# Patient Record
Sex: MALE | Race: BLACK OR AFRICAN AMERICAN | URBAN - NONMETROPOLITAN AREA
[De-identification: names, ages, dates, MRNs, and addresses within clinical notes are randomized per-mention and may not be internally consistent; named-entity substitution may affect disease eponyms.]

---

## 2016-12-22 RX ORDER — ACETAMINOPHEN 325 MG/1
650 TABLET ORAL EVERY 6 HOURS PRN
COMMUNITY

## 2016-12-22 RX ORDER — TIZANIDINE HYDROCHLORIDE 4 MG/1
4 CAPSULE, GELATIN COATED ORAL DAILY
COMMUNITY

## 2016-12-22 RX ORDER — TRAMADOL HYDROCHLORIDE 50 MG/1
50 TABLET ORAL EVERY 6 HOURS PRN
COMMUNITY

## 2016-12-22 RX ORDER — GLIPIZIDE 10 MG/1
10 TABLET ORAL DAILY
COMMUNITY

## 2016-12-22 RX ORDER — LISINOPRIL 10 MG/1
10 TABLET ORAL DAILY
COMMUNITY

## 2017-01-02 ENCOUNTER — ANESTHESIA EVENT (OUTPATIENT)
Dept: PERIOP | Facility: HOSPITAL | Age: 57
End: 2017-01-02
Payer: COMMERCIAL

## 2017-01-03 ENCOUNTER — HOSPITAL ENCOUNTER (OUTPATIENT)
Facility: HOSPITAL | Age: 57
Setting detail: OUTPATIENT SURGERY
Discharge: HOME | End: 2017-01-03
Attending: UROLOGY | Admitting: UROLOGY
Payer: COMMERCIAL

## 2017-01-03 ENCOUNTER — ANESTHESIA (OUTPATIENT)
Dept: PERIOP | Facility: HOSPITAL | Age: 57
End: 2017-01-03
Payer: COMMERCIAL

## 2017-01-03 VITALS
DIASTOLIC BLOOD PRESSURE: 76 MMHG | RESPIRATION RATE: 18 BRPM | SYSTOLIC BLOOD PRESSURE: 115 MMHG | BODY MASS INDEX: 27.77 KG/M2 | TEMPERATURE: 98.5 F | HEART RATE: 57 BPM | HEIGHT: 72 IN | OXYGEN SATURATION: 97 % | WEIGHT: 205 LBS

## 2017-01-03 DIAGNOSIS — N47.1 PHIMOSIS: ICD-10-CM

## 2017-01-03 LAB
GLUCOSE SERPL-MCNC: 113 MG/DL (ref 65–140)
GLUCOSE SERPL-MCNC: 178 MG/DL (ref 65–140)

## 2017-01-03 PROCEDURE — 82948 REAGENT STRIP/BLOOD GLUCOSE: CPT

## 2017-01-03 PROCEDURE — 88304 TISSUE EXAM BY PATHOLOGIST: CPT | Performed by: UROLOGY

## 2017-01-03 RX ORDER — DOCUSATE SODIUM 100 MG/1
100 CAPSULE, LIQUID FILLED ORAL 2 TIMES DAILY
Qty: 60 CAPSULE | Refills: 0 | Status: SHIPPED | OUTPATIENT
Start: 2017-01-03 | End: 2017-02-02

## 2017-01-03 RX ORDER — FENTANYL CITRATE 50 UG/ML
INJECTION, SOLUTION INTRAMUSCULAR; INTRAVENOUS AS NEEDED
Status: DISCONTINUED | OUTPATIENT
Start: 2017-01-03 | End: 2017-01-03 | Stop reason: SURG

## 2017-01-03 RX ORDER — FENTANYL CITRATE/PF 50 MCG/ML
25 SYRINGE (ML) INJECTION
Status: DISCONTINUED | OUTPATIENT
Start: 2017-01-03 | End: 2017-01-03 | Stop reason: HOSPADM

## 2017-01-03 RX ORDER — LIDOCAINE HYDROCHLORIDE 10 MG/ML
INJECTION, SOLUTION INFILTRATION; PERINEURAL AS NEEDED
Status: DISCONTINUED | OUTPATIENT
Start: 2017-01-03 | End: 2017-01-03 | Stop reason: SURG

## 2017-01-03 RX ORDER — BUPIVACAINE HYDROCHLORIDE 5 MG/ML
INJECTION, SOLUTION EPIDURAL; INTRACAUDAL AS NEEDED
Status: DISCONTINUED | OUTPATIENT
Start: 2017-01-03 | End: 2017-01-03 | Stop reason: HOSPADM

## 2017-01-03 RX ORDER — MIDAZOLAM HYDROCHLORIDE 1 MG/ML
INJECTION INTRAMUSCULAR; INTRAVENOUS AS NEEDED
Status: DISCONTINUED | OUTPATIENT
Start: 2017-01-03 | End: 2017-01-03 | Stop reason: SURG

## 2017-01-03 RX ORDER — EPHEDRINE SULFATE 50 MG/ML
INJECTION, SOLUTION INTRAVENOUS AS NEEDED
Status: DISCONTINUED | OUTPATIENT
Start: 2017-01-03 | End: 2017-01-03 | Stop reason: SURG

## 2017-01-03 RX ORDER — CEPHALEXIN 500 MG/1
500 CAPSULE ORAL 3 TIMES DAILY
Qty: 15 CAPSULE | Refills: 0 | Status: SHIPPED | OUTPATIENT
Start: 2017-01-03 | End: 2017-01-08

## 2017-01-03 RX ORDER — SODIUM CHLORIDE, SODIUM LACTATE, POTASSIUM CHLORIDE, CALCIUM CHLORIDE 600; 310; 30; 20 MG/100ML; MG/100ML; MG/100ML; MG/100ML
125 INJECTION, SOLUTION INTRAVENOUS CONTINUOUS
Status: DISCONTINUED | OUTPATIENT
Start: 2017-01-03 | End: 2017-01-03 | Stop reason: HOSPADM

## 2017-01-03 RX ORDER — ONDANSETRON 2 MG/ML
INJECTION INTRAMUSCULAR; INTRAVENOUS AS NEEDED
Status: DISCONTINUED | OUTPATIENT
Start: 2017-01-03 | End: 2017-01-03 | Stop reason: SURG

## 2017-01-03 RX ORDER — PROPOFOL 10 MG/ML
INJECTION, EMULSION INTRAVENOUS AS NEEDED
Status: DISCONTINUED | OUTPATIENT
Start: 2017-01-03 | End: 2017-01-03 | Stop reason: SURG

## 2017-01-03 RX ORDER — GLYCOPYRROLATE 0.2 MG/ML
INJECTION INTRAMUSCULAR; INTRAVENOUS AS NEEDED
Status: DISCONTINUED | OUTPATIENT
Start: 2017-01-03 | End: 2017-01-03 | Stop reason: SURG

## 2017-01-03 RX ORDER — HYDROCODONE BITARTRATE AND ACETAMINOPHEN 5; 325 MG/1; MG/1
2 TABLET ORAL EVERY 6 HOURS PRN
Status: DISCONTINUED | OUTPATIENT
Start: 2017-01-03 | End: 2017-01-03 | Stop reason: HOSPADM

## 2017-01-03 RX ORDER — HYDROCODONE BITARTRATE AND ACETAMINOPHEN 5; 325 MG/1; MG/1
2 TABLET ORAL EVERY 6 HOURS PRN
Qty: 40 TABLET | Refills: 0 | Status: SHIPPED | OUTPATIENT
Start: 2017-01-03

## 2017-01-03 RX ADMIN — FENTANYL CITRATE 25 MCG: 50 INJECTION, SOLUTION INTRAMUSCULAR; INTRAVENOUS at 13:07

## 2017-01-03 RX ADMIN — SODIUM CHLORIDE, SODIUM LACTATE, POTASSIUM CHLORIDE, AND CALCIUM CHLORIDE: .6; .31; .03; .02 INJECTION, SOLUTION INTRAVENOUS at 13:36

## 2017-01-03 RX ADMIN — FENTANYL CITRATE 25 MCG: 50 INJECTION, SOLUTION INTRAMUSCULAR; INTRAVENOUS at 13:00

## 2017-01-03 RX ADMIN — EPHEDRINE SULFATE 10 MG: 50 INJECTION, SOLUTION INTRAMUSCULAR; INTRAVENOUS; SUBCUTANEOUS at 13:43

## 2017-01-03 RX ADMIN — SODIUM CHLORIDE, SODIUM LACTATE, POTASSIUM CHLORIDE, AND CALCIUM CHLORIDE 125 ML/HR: .6; .31; .03; .02 INJECTION, SOLUTION INTRAVENOUS at 11:55

## 2017-01-03 RX ADMIN — FENTANYL CITRATE 25 MCG: 50 INJECTION, SOLUTION INTRAMUSCULAR; INTRAVENOUS at 13:20

## 2017-01-03 RX ADMIN — CEFAZOLIN SODIUM 2000 MG: 2 SOLUTION INTRAVENOUS at 12:56

## 2017-01-03 RX ADMIN — EPHEDRINE SULFATE 10 MG: 50 INJECTION, SOLUTION INTRAMUSCULAR; INTRAVENOUS; SUBCUTANEOUS at 13:17

## 2017-01-03 RX ADMIN — GLYCOPYRROLATE 0.2 MG: 0.2 INJECTION, SOLUTION INTRAMUSCULAR; INTRAVENOUS at 12:53

## 2017-01-03 RX ADMIN — FENTANYL CITRATE 25 MCG: 50 INJECTION, SOLUTION INTRAMUSCULAR; INTRAVENOUS at 13:50

## 2017-01-03 RX ADMIN — EPHEDRINE SULFATE 10 MG: 50 INJECTION, SOLUTION INTRAMUSCULAR; INTRAVENOUS; SUBCUTANEOUS at 13:34

## 2017-01-03 RX ADMIN — PROPOFOL 200 MG: 10 INJECTION, EMULSION INTRAVENOUS at 13:00

## 2017-01-03 RX ADMIN — MIDAZOLAM HYDROCHLORIDE 2 MG: 1 INJECTION, SOLUTION INTRAMUSCULAR; INTRAVENOUS at 12:53

## 2017-01-03 RX ADMIN — LIDOCAINE HYDROCHLORIDE 100 MG: 10 INJECTION, SOLUTION INFILTRATION; PERINEURAL at 13:00

## 2017-01-03 RX ADMIN — ONDANSETRON HYDROCHLORIDE 4 MG: 2 INJECTION, SOLUTION INTRAVENOUS at 12:56

## 2017-01-10 ENCOUNTER — ALLSCRIPTS OFFICE VISIT (OUTPATIENT)
Dept: OTHER | Facility: OTHER | Age: 57
End: 2017-01-10

## 2017-02-22 ENCOUNTER — ALLSCRIPTS OFFICE VISIT (OUTPATIENT)
Dept: OTHER | Facility: OTHER | Age: 57
End: 2017-02-22

## 2017-04-12 ENCOUNTER — LAB REQUISITION (OUTPATIENT)
Dept: LAB | Facility: HOSPITAL | Age: 57
End: 2017-04-12
Payer: COMMERCIAL

## 2017-04-12 ENCOUNTER — ALLSCRIPTS OFFICE VISIT (OUTPATIENT)
Dept: OTHER | Facility: OTHER | Age: 57
End: 2017-04-12

## 2017-04-12 DIAGNOSIS — R30.9 PAINFUL MICTURITION: ICD-10-CM

## 2017-04-12 DIAGNOSIS — R35.1 NOCTURIA: ICD-10-CM

## 2017-04-12 LAB
BACTERIA UR QL AUTO: ABNORMAL /HPF
BILIRUB UR QL STRIP: NEGATIVE
CLARITY UR: CLEAR
COLOR UR: YELLOW
GLUCOSE UR STRIP-MCNC: ABNORMAL MG/DL
HGB UR QL STRIP.AUTO: NEGATIVE
HYALINE CASTS #/AREA URNS LPF: ABNORMAL /LPF
KETONES UR STRIP-MCNC: NEGATIVE MG/DL
LEUKOCYTE ESTERASE UR QL STRIP: NEGATIVE
NITRITE UR QL STRIP: NEGATIVE
NON-SQ EPI CELLS URNS QL MICRO: ABNORMAL /HPF
PH UR STRIP.AUTO: 7 [PH] (ref 4.5–8)
PROT UR STRIP-MCNC: NEGATIVE MG/DL
RBC #/AREA URNS AUTO: ABNORMAL /HPF
SP GR UR STRIP.AUTO: 1.02 (ref 1–1.03)
UROBILINOGEN UR QL STRIP.AUTO: 0.2 E.U./DL
WBC #/AREA URNS AUTO: ABNORMAL /HPF

## 2017-04-12 PROCEDURE — 81001 URINALYSIS AUTO W/SCOPE: CPT | Performed by: PHYSICIAN ASSISTANT

## 2017-04-12 PROCEDURE — 87086 URINE CULTURE/COLONY COUNT: CPT | Performed by: PHYSICIAN ASSISTANT

## 2017-04-13 LAB — BACTERIA UR CULT: NORMAL

## 2017-05-30 ENCOUNTER — ALLSCRIPTS OFFICE VISIT (OUTPATIENT)
Dept: OTHER | Facility: OTHER | Age: 57
End: 2017-05-30

## 2018-01-12 NOTE — RESULT NOTES
Verified Results  ECG 12-LEAD [id number] 10:32AM Melinda Hernandez     Test Name Result Flag Reference   ECG 12-LEAD      Normal sinus rhythm   Normal ECG   No previous ECGs available   Confirmed by Jeremiah 08, 0314 [facility] ([phone]) on 12/22/2016 12:49:44 PM

## 2018-01-13 VITALS
WEIGHT: 208.38 LBS | BODY MASS INDEX: 28.22 KG/M2 | HEIGHT: 72 IN | DIASTOLIC BLOOD PRESSURE: 74 MMHG | HEART RATE: 72 BPM | SYSTOLIC BLOOD PRESSURE: 140 MMHG

## 2018-01-13 VITALS
HEIGHT: 72 IN | HEART RATE: 72 BPM | BODY MASS INDEX: 27.5 KG/M2 | RESPIRATION RATE: 16 BRPM | WEIGHT: 203 LBS | DIASTOLIC BLOOD PRESSURE: 70 MMHG | SYSTOLIC BLOOD PRESSURE: 140 MMHG

## 2018-01-13 VITALS — DIASTOLIC BLOOD PRESSURE: 80 MMHG | HEART RATE: 60 BPM | SYSTOLIC BLOOD PRESSURE: 140 MMHG

## 2018-01-14 NOTE — RESULT NOTES
Verified Results  (1) PT WITH INR [id number] 10:39AM Shalom Sara     Test Name Result Flag Reference   INR 1 08  0 86-1 16   PT 13 7 seconds  12 0-14 3

## 2018-01-15 VITALS
DIASTOLIC BLOOD PRESSURE: 74 MMHG | HEIGHT: 72 IN | WEIGHT: 207.38 LBS | SYSTOLIC BLOOD PRESSURE: 132 MMHG | BODY MASS INDEX: 28.09 KG/M2 | HEART RATE: 64 BPM

## 2018-01-16 NOTE — RESULT NOTES
Verified Results  (1) APTT [id number] 10:39AM Kleber Hernandez     Test Name Result Flag Reference   PARTIAL THROMBOPLASTIN TIME 26 seconds  24-36   Therapeutic Heparin Range = 60-90 seconds     (1) CBC/PLT/DIFF [id number] 10:39AM Kleber Hernandez     Test Name Result Flag Reference   WBC COUNT 6 81 Thousand/uL  4 31-10 16   RBC COUNT 5 13 Million/uL  3 88-5 62   HEMOGLOBIN 15 2 g/dL  12 0-17 0   HEMATOCRIT 45 1 %  36 5-49 3   MCV 88 fL  82-98   MCH 29 6 pg  26 8-34 3   MCHC 33 7 g/dL  31 4-37 4   RDW 12 5 %  11 6-15 1   MPV 11 1 fL  8 9-12 7   PLATELET COUNT 060 Thousands/uL  149-390   NEUTROPHILS RELATIVE PERCENT 67 %  43-75   LYMPHOCYTES RELATIVE PERCENT 28 %  14-44   MONOCYTES RELATIVE PERCENT 5 %  4-12   EOSINOPHILS RELATIVE PERCENT 0 %  0-6   BASOPHILS RELATIVE PERCENT 0 %  0-1   NEUTROPHILS ABSOLUTE COUNT 4 55 Thousands/?L  1 85-7 62   LYMPHOCYTES ABSOLUTE COUNT 1 87 Thousands/?L  0 60-4 47   MONOCYTES ABSOLUTE COUNT 0 35 Thousand/?L  0 17-1 22   EOSINOPHILS ABSOLUTE COUNT 0 03 Thousand/?L  0 00-0 61   BASOPHILS ABSOLUTE COUNT 0 01 Thousands/?L  0 00-0 10     (1) BASIC METABOLIC PROFILE [id number] 10:39AM Kleber Hernandez     Test Name Result Flag Reference   GLUCOSE,RANDM 209 mg/dL H    If the patient is fasting, the ADA then defines impaired fasting glucose as > 100 mg/dL and diabetes as > or equal to 123 mg/dL     SODIUM 138 mmol/L  136-145   POTASSIUM 5 0 mmol/L  3 5-5 3   CHLORIDE 101 mmol/L  100-108   CARBON DIOXIDE 30 mmol/L  21-32   ANION GAP (CALC) 7 mmol/L  4-13   BLOOD UREA NITROGEN 22 mg/dL  5-25   CREATININE 1 01 mg/dL  0 60-1 30   Standardized to IDMS reference method   CALCIUM 9 3 mg/dL  8 3-10 1   eGFR Non-African American      >60 0 ml/min/1 [facility] [employer] Disease Education Program recommendations are as follows:  GFR calculation is accurate only with a steady state creatinine  Chronic Kidney disease less than 60 ml/min/1 73 sq  meters  Kidney failure less than 15 ml/min/1 73 sq  meters

## 2018-02-22 DIAGNOSIS — Z12.5 ENCOUNTER FOR SCREENING FOR MALIGNANT NEOPLASM OF PROSTATE: ICD-10-CM

## 2018-02-23 ENCOUNTER — APPOINTMENT (OUTPATIENT)
Dept: LAB | Facility: HOSPITAL | Age: 58
End: 2018-02-23
Payer: COMMERCIAL

## 2018-02-23 ENCOUNTER — TRANSCRIBE ORDERS (OUTPATIENT)
Dept: ADMINISTRATIVE | Facility: HOSPITAL | Age: 58
End: 2018-02-23

## 2018-02-23 DIAGNOSIS — Z12.5 ENCOUNTER FOR SCREENING FOR MALIGNANT NEOPLASM OF PROSTATE: ICD-10-CM

## 2018-02-23 PROBLEM — N47.1 PHIMOSIS: Status: ACTIVE | Noted: 2018-02-23

## 2018-02-23 PROBLEM — IMO0001 MALE IMPOTENCE: Status: ACTIVE | Noted: 2018-02-23

## 2018-02-23 LAB — PSA SERPL-MCNC: 0.6 NG/ML (ref 0–4)

## 2018-02-23 PROCEDURE — 36415 COLL VENOUS BLD VENIPUNCTURE: CPT

## 2018-02-23 PROCEDURE — G0103 PSA SCREENING: HCPCS

## 2018-03-16 NOTE — PROGRESS NOTES
3/19/2018      Chief Complaint   Patient presents with    Benign Prostatic Hypertrophy     PSA as of 2/23/18  0 6       Assessment and Plan    65 y o  male managed by Dr Josefina Barth    1  Phimosis s/p circ 1/3/17  - no complaints     2  Mild ED  - will start sildenafil 20mg (sent to [facility])  - SE profile reviewed     3  Prostate cancer screening   - PSA 0 6 (2/23/18)  - REBECCA deferred by patient  - FU 1 year with PSA prior and REBECCA at visit      History of Present Illness  Lawanda Gutierrez is a 65 y o  male here for follow up evaluation of phimosis status post circumcision 1/3/17, mild ED, and prostate cancer screening  He does have diabetes mellitus for which he takes Januvia and insulin that has not always been well controlled  PSA 2/23/18 0 6  No bothersome LUTs  Review of Systems   Constitutional: Negative for activity change, chills and fever  Gastrointestinal: Negative for abdominal distention and abdominal pain  Musculoskeletal: Negative for back pain and gait problem  Psychiatric/Behavioral: Negative for behavioral problems and confusion  Urinary Incontinence Screening    Flowsheet Row Most Recent Value   Urinary Incontinence   Urinary Incontinence? No   Incomplete emptying? No   Urinary frequency? Yes   Urinary urgency? Yes   Urinary hesitancy? No   Dysuria (painful difficult urination)? No   Nocturia (waking up to use the bathroom)? Yes   Straining (having to push to go)?   No   Weak stream?  No   Intermittent stream?  No          Past Medical History  Past Medical History:   Diagnosis Date    Diabetes mellitus ([address] )     Hypertension        Past Social History  Past Surgical History:   Procedure Laterality Date    COLONOSCOPY      ESOPHAGOGASTRODUODENOSCOPY      HI CIRCUMCISION,OTHR N/A 1/3/2017    Procedure: CIRCUMCISION WITH PENILE NERVE BLOCK;  Surgeon: Mindi Mandujano MD;  Location: [facility];  Service: Urology     History   Smoking Status    Former Smoker   Smokeless Tobacco    Never Used     Comment: quit 20 yrs ago       Past Family History  No family history on file  Past Social history  Social History     Social History    Marital status: Single     Spouse name: N/A    Number of children: N/A    Years of education: N/A     Occupational History    Not on file       Social History Main Topics    Smoking status: Former Smoker    Smokeless tobacco: Never Used      Comment: quit 20 yrs ago    Alcohol use Yes      Comment: socially    Drug use: No    Sexual activity: Not on file     Other Topics Concern    Not on file     Social History Narrative    No narrative on file       Current Medications  Current Outpatient Prescriptions   Medication Sig Dispense Refill    ACCU-CHEK DIEGO PLUS test strip 2 (two) times a day Test blood sugar  5    gabapentin (NEURONTIN) 600 MG tablet Take 600 mg by mouth 3 (three) times a day  3    glipiZIDE (GLUCOTROL XL) 10 mg 24 hr tablet Take 10 mg by mouth daily  5    glipiZIDE (GLUCOTROL) 10 mg tablet Take 10 mg by mouth daily      HYDROcodone-acetaminophen (NORCO) 5-325 mg per tablet Take 2 tablets by mouth every 6 (six) hours as needed for pain for up to 40 doses Max Daily Amount: 8 tablets 40 tablet 0    JANUMET -1000 MG TB24 TAKE 1 TABLET BY MOUTH EVERY DAY WITH EVENING MEAL  5    lisinopril (ZESTRIL) 10 mg tablet Take 10 mg by mouth daily      omeprazole (PriLOSEC) 40 MG capsule Take 40 mg by mouth      acetaminophen (TYLENOL) 325 mg tablet Take 650 mg by mouth every 6 (six) hours as needed for mild pain 2 tabs      docusate sodium (COLACE) 100 mg capsule Take 1 capsule by mouth 2 (two) times a day for 30 days 60 capsule 0    oxyCODONE-acetaminophen (PERCOCET) 5-325 mg per tablet Take 1-2 tablets by mouth every 4 (four) hours      sildenafil (REVATIO) 20 mg tablet Take 1 tablet (20 mg total) by mouth 3 (three) times a day 90 tablet 3    sitaGLIPtin (JANUVIA) 100 mg tablet Take 100 mg by mouth daily      TiZANidine (ZANAFLEX) 4 MG capsule Take 4 mg by mouth daily      traMADol (ULTRAM) 50 mg tablet Take 50 mg by mouth every 6 (six) hours as needed for moderate pain       No current facility-administered medications for this visit  Allergies  No Known Allergies      The following portions of the patient's history were reviewed and updated as appropriate: allergies, current medications, past medical history, past social history, past surgical history and problem list       Vitals  Vitals:    03/19/18 1111   BP: 140/80   Pulse: 64   Weight: 90 3 kg (199 lb)         Physical Exam    Constitutional   General appearance: Patient is seated and in no acute distress, well appearing and well nourished  Head and Face   Head and face: Normal     Eyes   Conjunctiva and lids: No erythema, swelling or discharge  Ears, Nose, Mouth, and Throat   Hearing: Normal     Pulmonary   Respiratory effort: No increased work of breathing or signs of respiratory distress  Cardiovascular   Examination of extremities for edema and/or varicosities: Normal     Abdomen   Abdomen: Non-tender, no masses  Genitourinary   Digital rectal exam of prostate: deferred   Musculoskeletal   Gait and station: Normal, no disturbances  Skin   Skin and subcutaneous tissue: Warm, dry, and intact  No visible lesions or rashes    Psychiatric   Judgment and insight: Normal  Recent and remote memory:  Normal  Mood and affect: Normal        Results  No results found for this or any previous visit (from the past 1 hour(s)) ]  Lab Results   Component Value Date    PSA 0 6 02/23/2018     Lab Results   Component Value Date    GLUCOSE 209 (H) 12/22/2016    CALCIUM 9 3 12/22/2016     12/22/2016    K 5 0 12/22/2016    CO2 30 12/22/2016     12/22/2016    BUN 22 12/22/2016    CREATININE 1 01 12/22/2016     Lab Results   Component Value Date    WBC 6 81 12/22/2016    HGB 15 2 12/22/2016    HCT 45 1 12/22/2016    MCV 88 12/22/2016     12/22/2016           Orders  Orders Placed This Encounter   Procedures    PSA     This is a patient instruction: This test is non-fasting  Please drink two glasses of water morning of bloodwork          Standing Status:   Future     Standing Expiration Date:   3/19/2019

## 2018-03-19 ENCOUNTER — OFFICE VISIT (OUTPATIENT)
Dept: UROLOGY | Facility: CLINIC | Age: 58
End: 2018-03-19
Payer: COMMERCIAL

## 2018-03-19 VITALS
SYSTOLIC BLOOD PRESSURE: 140 MMHG | DIASTOLIC BLOOD PRESSURE: 80 MMHG | HEART RATE: 64 BPM | WEIGHT: 199 LBS | BODY MASS INDEX: 26.99 KG/M2

## 2018-03-19 DIAGNOSIS — Z12.5 PROSTATE CANCER SCREENING: ICD-10-CM

## 2018-03-19 DIAGNOSIS — IMO0001 MALE IMPOTENCE: Primary | ICD-10-CM

## 2018-03-19 DIAGNOSIS — N47.1 PHIMOSIS: ICD-10-CM

## 2018-03-19 PROCEDURE — 99213 OFFICE O/P EST LOW 20 MIN: CPT | Performed by: PHYSICIAN ASSISTANT

## 2018-03-19 RX ORDER — GLIPIZIDE 10 MG/1
10 TABLET, FILM COATED, EXTENDED RELEASE ORAL DAILY
Refills: 5 | COMMUNITY
Start: 2018-03-10

## 2018-03-19 RX ORDER — OXYCODONE HYDROCHLORIDE AND ACETAMINOPHEN 5; 325 MG/1; MG/1
1-2 TABLET ORAL EVERY 4 HOURS
COMMUNITY
Start: 2015-10-15

## 2018-03-19 RX ORDER — OMEPRAZOLE 40 MG/1
40 CAPSULE, DELAYED RELEASE ORAL
COMMUNITY

## 2018-03-19 RX ORDER — SILDENAFIL CITRATE 20 MG/1
20 TABLET ORAL 3 TIMES DAILY
Qty: 90 TABLET | Refills: 3 | Status: SHIPPED | OUTPATIENT
Start: 2018-03-19 | End: 2019-05-30 | Stop reason: SDUPTHER

## 2018-03-19 RX ORDER — GABAPENTIN 600 MG/1
600 TABLET ORAL 3 TIMES DAILY
Refills: 3 | COMMUNITY
Start: 2018-02-13

## 2018-03-19 RX ORDER — BLOOD SUGAR DIAGNOSTIC
STRIP MISCELLANEOUS 2 TIMES DAILY
Refills: 5 | COMMUNITY
Start: 2018-03-04

## 2018-03-19 RX ORDER — SITAGLIPTIN AND METFORMIN HYDROCHLORIDE 100; 1000 MG/1; MG/1
TABLET, FILM COATED, EXTENDED RELEASE ORAL
Refills: 5 | COMMUNITY
Start: 2018-03-10

## 2019-04-22 ENCOUNTER — APPOINTMENT (OUTPATIENT)
Dept: LAB | Facility: HOSPITAL | Age: 59
End: 2019-04-22
Payer: COMMERCIAL

## 2019-04-22 DIAGNOSIS — N40.1 BENIGN PROSTATIC HYPERPLASIA WITH LOWER URINARY TRACT SYMPTOMS, SYMPTOM DETAILS UNSPECIFIED: Primary | ICD-10-CM

## 2019-04-22 DIAGNOSIS — N40.1 BENIGN PROSTATIC HYPERPLASIA WITH LOWER URINARY TRACT SYMPTOMS, SYMPTOM DETAILS UNSPECIFIED: ICD-10-CM

## 2019-04-22 LAB — PSA SERPL-MCNC: 0.8 NG/ML (ref 0–4)

## 2019-04-22 PROCEDURE — 84153 ASSAY OF PSA TOTAL: CPT

## 2019-05-01 ENCOUNTER — TELEPHONE (OUTPATIENT)
Dept: UROLOGY | Facility: MEDICAL CENTER | Age: 59
End: 2019-05-01

## 2019-05-30 ENCOUNTER — OFFICE VISIT (OUTPATIENT)
Dept: UROLOGY | Facility: CLINIC | Age: 59
End: 2019-05-30
Payer: COMMERCIAL

## 2019-05-30 VITALS
BODY MASS INDEX: 28.58 KG/M2 | SYSTOLIC BLOOD PRESSURE: 130 MMHG | HEIGHT: 72 IN | DIASTOLIC BLOOD PRESSURE: 80 MMHG | WEIGHT: 211 LBS | HEART RATE: 67 BPM

## 2019-05-30 DIAGNOSIS — Z12.5 PROSTATE CANCER SCREENING: ICD-10-CM

## 2019-05-30 DIAGNOSIS — N52.9 VASCULOGENIC ERECTILE DYSFUNCTION, UNSPECIFIED VASCULOGENIC ERECTILE DYSFUNCTION TYPE: Primary | ICD-10-CM

## 2019-05-30 PROCEDURE — 99214 OFFICE O/P EST MOD 30 MIN: CPT | Performed by: PHYSICIAN ASSISTANT

## 2019-05-30 RX ORDER — SILDENAFIL CITRATE 20 MG/1
20 TABLET ORAL AS NEEDED
Qty: 30 TABLET | Refills: 0 | Status: SHIPPED | OUTPATIENT
Start: 2019-05-30

## 2023-03-07 ENCOUNTER — EVALUATION (OUTPATIENT)
Dept: PHYSICAL THERAPY | Facility: CLINIC | Age: 63
End: 2023-03-07

## 2023-03-07 DIAGNOSIS — M51.36 DDD (DEGENERATIVE DISC DISEASE), LUMBAR: Primary | ICD-10-CM

## 2023-03-07 NOTE — PROGRESS NOTES
PT Evaluation     Today's date: 3/7/2023  Patient name: Viet Arredondo  : 1960  MRN: [id number]  Referring provider: Monica Pablo MD  Dx:   Encounter Diagnosis     ICD-10-CM    1  DDD (degenerative disc disease), lumbar  M51 36           Start Time: 0800  Stop Time: 0900  Total time in clinic (min): 60 minutes    Assessment  Assessment details: Patient is a 64 y/o male with chief c/o B/L LBP  Patient was instructed to avoid placing a wallet in the back pocket as sitting on the wallet can effect the alignment of the pelvis and place abnormal pressure on the lumbosacral soft tissue structures and sciatic nerve  Patient presents with difficulties during heel toe walking secondary to loss of balance but is able to maintain plantar flexion and dorsiflexion positions during single leg stance phase  He presents with noted limitation to B/L hips during pass mobility assessment  There is tenderness noted to the lumbar paraspinal musculature most notable at the levels of L2-4  There was increased central low back pain noted with repeated extension in standing but no effect on radicular symptoms  Educated patient on performance of self stretches for home program with verbal understanding noted  Impairments: abnormal or restricted ROM, activity intolerance, impaired physical strength, lacks appropriate home exercise program and pain with function    Symptom irritability: moderate  Goals  STGs:  "Patient will be independent with hep by 2-3 visits  Decrease low back pain by 25% for improved tolerance with adls and home duties by 3-4 weeks  Improve Lumbar rom to wfl for improved tolerance with adls and home duties by 3-4 weeks  "    LTGs:  "Improve FOTO Score from 37 to 50 indicating improved tolerance with activities involving the low back by discharge  Patient will demonstrate rom and strength to the lumbar spine wfl for improved tolerance with adls and home duties by discharge     Patient will be free of radicular symptoms by discharge  "      Plan  Plan details: Patient informed that from this point forward, to ensure adherence to the aforementioned plan of care, all or some of the treatment may be performed and carried out by a Physical Therapy Assistant (PTA) with supervision from a licensed Physical Therapist (PT) in accordance with [address] [address]  Patient will continue to benefit from skilled physical therapy to address the functional deficits that were identified during the evaluation today  We will continue to progress the therapy program to address these functional deficits and achieve the established goals  Patient would benefit from: skilled physical therapy  Planned modality interventions: cryotherapy and thermotherapy: hydrocollator packs  Planned therapy interventions: abdominal trunk stabilization, ADL retraining, body mechanics training, flexibility, functional ROM exercises, home exercise program, therapeutic exercise, therapeutic activities, stretching, strengthening, postural training, patient education, joint mobilization and manual therapy  Frequency: 2x week  Duration in weeks: 8  Plan of Care beginning date: 3/7/2023  Plan of Care expiration date: 2023  Treatment plan discussed with: patient        Subjective Evaluation    History of Present Illness  Mechanism of injury: Patient presents to out patient physical therapy with chief c/o LBP  Patient reports a long standing history of low back pain  He reports radicular symptoms into the L LE to the foot  He notes that these symptoms are constant  He has occasional N/T into the L LE which will be alleviated with positional changes     Quality of life: good    Pain  Current pain ratin  At best pain ratin  At worst pain ratin  Location: Lumbar  Quality: radiating, discomfort, dull ache, sharp and throbbing  Relieving factors: change in position  Exacerbated by: Prolonged sitting, sleeping, standing after prolonged sitting  Treatments  Previous treatment: chiropractic  Current treatment: physical therapy  Patient Goals  Patient goals for therapy: decreased pain, increased motion and independence with ADLs/IADLs  Patient goal: Patient wishes to be in less pain and have better tolerance for his daily activities  Objective     Neurological Testing     Sensation     Lumbar   Left   Intact: light touch    Right   Intact: light touch    Reflexes   Left   Patellar (L4): normal (2+)  Achilles (S1): trace (1+)  Clonus sign: negative    Right   Patellar (L4): normal (2+)  Achilles (S1): trace (1+)  Clonus sign: negative    Active Range of Motion     Lumbar   Flexion:  with pain Restriction level: minimal  Extension:  with pain Restriction level: moderate  Left lateral flexion:  Restriction level: minimal  Right lateral flexion:  Restriction level: minimal  Left rotation:  Restriction level: minimal  Right rotation:  Restriction level: minimal  Mechanical Assessment    Cervical      Thoracic      Lumbar    Standing flexion: repeated movements   Pain location:no change  Pain level: increased  Standing extension: repeated movements  Pain location: centralized  Pain intensity: worse  Pain level: increased    Strength/Myotome Testing     Lumbar   Left   Heel walk: normal  Toe walk: normal    Right   Heel walk: normal  Toe walk: normal    Left Hip   Planes of Motion   Flexion: 4  Extension: 4-  Abduction: 4-  Adduction: 4    Right Hip   Planes of Motion   Flexion: 4  Extension: 4-  Abduction: 4-  Adduction: 4    Left Knee   Flexion: 4-  Extension: 4    Right Knee   Flexion: 4-  Extension: 4-    Left Ankle/Foot   Dorsiflexion: 4  Plantar flexion: 4    Right Ankle/Foot   Dorsiflexion: 4  Plantar flexion: 4    Muscle Activation     Additional Muscle Activation Details  Rectus abdominus: 3+/5  Multifidus: 4-/5    Tests     Lumbar     Left   Positive passive SLR     Negative crossed SLR and slump test      Right Positive passive SLR  Negative crossed SLR and slump test      Left Pelvic Girdle/Sacrum   Positive: active SLR test      Right Pelvic Girdle/Sacrum   Positive: active SLR test      Left Hip   Positive IRMA and FADIR  Right Hip   Positive IRMA and FADIR  Flowsheet Rows    Flowsheet Row Most Recent Value   PT/OT G-Codes    Current Score 37   Projected Score 50             Precautions: Hx of Cervical fusion C5/6, C6/7    Date 3/7 IE       FOTO 37 SC       Manuals        Passive piriformis and hamstring stretch 30" 5x ea                                 Neuro Re-Ed                                                                Ther Ex        NuStep        Child's pose        Open Book                                                Ther Activity        Cat/Camel                Gait Training                        Modalities

## 2023-03-07 NOTE — LETTER
2023    MD Adarsh [place] [address]    Patient: Deana Steward   YOB: 1960   Date of Visit: 3/7/2023     Encounter Diagnosis     ICD-10-CM    1  DDD (degenerative disc disease), lumbar  M51 36           Dear Dr Amarjit Rawls: Thank you for your recent referral of Deana Steward  Please review the attached evaluation summary from Real's recent visit  Please verify that you agree with the plan of care by signing the attached order  If you have any questions or concerns, please do not hesitate to call  I sincerely appreciate the opportunity to share in the care of one of your patients and hope to have another opportunity to work with you in the near future  Sincerely,    Cristiano Marks, PT      Referring Provider:      I certify that I have read the below Plan of Care and certify the need for these services furnished under this plan of treatment while under my care  MD Adarsh [place] [address]  Via Fax: [phone]          PT Evaluation     Today's date: 3/7/2023  Patient name: Deana Steward  : 1960  MRN: [id number]  Referring provider: Merline Spencer, MD  Dx:   Encounter Diagnosis     ICD-10-CM    1  DDD (degenerative disc disease), lumbar  M51 36           Start Time: 0800  Stop Time: 0900  Total time in clinic (min): 60 minutes    Assessment  Assessment details: Patient is a 66 y/o male with chief c/o B/L LBP  Patient was instructed to avoid placing a wallet in the back pocket as sitting on the wallet can effect the alignment of the pelvis and place abnormal pressure on the lumbosacral soft tissue structures and sciatic nerve  Patient presents with difficulties during heel toe walking secondary to loss of balance but is able to maintain plantar flexion and dorsiflexion positions during single leg stance phase   He presents with noted limitation to B/L hips during pass mobility assessment  There is tenderness noted to the lumbar paraspinal musculature most notable at the levels of L2-4  There was increased central low back pain noted with repeated extension in standing but no effect on radicular symptoms  Educated patient on performance of self stretches for home program with verbal understanding noted  Impairments: abnormal or restricted ROM, activity intolerance, impaired physical strength, lacks appropriate home exercise program and pain with function    Symptom irritability: moderate  Goals  STGs:  "Patient will be independent with hep by 2-3 visits  Decrease low back pain by 25% for improved tolerance with adls and home duties by 3-4 weeks  Improve Lumbar rom to wfl for improved tolerance with adls and home duties by 3-4 weeks  "    LTGs:  "Improve FOTO Score from 37 to 50 indicating improved tolerance with activities involving the low back by discharge  Patient will demonstrate rom and strength to the lumbar spine wfl for improved tolerance with adls and home duties by discharge  Patient will be free of radicular symptoms by discharge  "      Plan  Plan details: Patient informed that from this point forward, to ensure adherence to the aforementioned plan of care, all or some of the treatment may be performed and carried out by a Physical Therapy Assistant (PTA) with supervision from a licensed Physical Therapist (PT) in accordance with [address]  Patient will continue to benefit from skilled physical therapy to address the functional deficits that were identified during the evaluation today  We will continue to progress the therapy program to address these functional deficits and achieve the established goals          Patient would benefit from: skilled physical therapy  Planned modality interventions: cryotherapy and thermotherapy: hydrocollator packs  Planned therapy interventions: abdominal trunk stabilization, ADL retraining, body mechanics training, flexibility, functional ROM exercises, home exercise program, therapeutic exercise, therapeutic activities, stretching, strengthening, postural training, patient education, joint mobilization and manual therapy  Frequency: 2x week  Duration in weeks: 8  Plan of Care beginning date: 3/7/2023  Plan of Care expiration date: 2023  Treatment plan discussed with: patient        Subjective Evaluation    History of Present Illness  Mechanism of injury: Patient presents to out patient physical therapy with chief c/o LBP  Patient reports a long standing history of low back pain  He reports radicular symptoms into the L LE to the foot  He notes that these symptoms are constant  He has occasional N/T into the L LE which will be alleviated with positional changes  Quality of life: good    Pain  Current pain ratin  At best pain ratin  At worst pain ratin  Location: Lumbar  Quality: radiating, discomfort, dull ache, sharp and throbbing  Relieving factors: change in position  Exacerbated by: Prolonged sitting, sleeping, standing after prolonged sitting  Treatments  Previous treatment: chiropractic  Current treatment: physical therapy  Patient Goals  Patient goals for therapy: decreased pain, increased motion and independence with ADLs/IADLs  Patient goal: Patient wishes to be in less pain and have better tolerance for his daily activities           Objective     Neurological Testing     Sensation     Lumbar   Left   Intact: light touch    Right   Intact: light touch    Reflexes   Left   Patellar (L4): normal (2+)  Achilles (S1): trace (1+)  Clonus sign: negative    Right   Patellar (L4): normal (2+)  Achilles (S1): trace (1+)  Clonus sign: negative    Active Range of Motion     Lumbar   Flexion:  with pain Restriction level: minimal  Extension:  with pain Restriction level: moderate  Left lateral flexion:  Restriction level: minimal  Right lateral flexion:  Restriction level: minimal  Left rotation:  Restriction level: minimal  Right rotation:  Restriction level: minimal  Mechanical Assessment    Cervical      Thoracic      Lumbar    Standing flexion: repeated movements   Pain location:no change  Pain level: increased  Standing extension: repeated movements  Pain location: centralized  Pain intensity: worse  Pain level: increased    Strength/Myotome Testing     Lumbar   Left   Heel walk: normal  Toe walk: normal    Right   Heel walk: normal  Toe walk: normal    Left Hip   Planes of Motion   Flexion: 4  Extension: 4-  Abduction: 4-  Adduction: 4    Right Hip   Planes of Motion   Flexion: 4  Extension: 4-  Abduction: 4-  Adduction: 4    Left Knee   Flexion: 4-  Extension: 4    Right Knee   Flexion: 4-  Extension: 4-    Left Ankle/Foot   Dorsiflexion: 4  Plantar flexion: 4    Right Ankle/Foot   Dorsiflexion: 4  Plantar flexion: 4    Muscle Activation     Additional Muscle Activation Details  Rectus abdominus: 3+/5  Multifidus: 4-/5    Tests     Lumbar     Left   Positive passive SLR  Negative crossed SLR and slump test      Right   Positive passive SLR  Negative crossed SLR and slump test      Left Pelvic Girdle/Sacrum   Positive: active SLR test      Right Pelvic Girdle/Sacrum   Positive: active SLR test      Left Hip   Positive IRMA and FADIR  Right Hip   Positive IRMA and FADIR  Flowsheet Rows    Flowsheet Row Most Recent Value   PT/OT G-Codes    Current Score 37   Projected Score 50            Precautions: Hx of Cervical fusion C5/6, C6/7    Date 3/7 IE       FOTO 37 SC       Manuals        Passive piriformis and hamstring stretch 30" 5x ea                                 Neuro Re-Ed                                                                Ther Ex        NuStep        Child's pose        Open Book                                                Ther Activity        Cat/Camel                Gait Training                        Modalities

## 2023-03-14 ENCOUNTER — OFFICE VISIT (OUTPATIENT)
Dept: PHYSICAL THERAPY | Facility: CLINIC | Age: 63
End: 2023-03-14

## 2023-03-14 DIAGNOSIS — M51.36 DDD (DEGENERATIVE DISC DISEASE), LUMBAR: Primary | ICD-10-CM

## 2023-03-14 NOTE — PROGRESS NOTES
Daily Note     Today's date: 3/14/2023  Patient name: Autumn Aranda  : 1960  MRN: [id number]  Referring provider: Emre Munguia MD  Dx:   Encounter Diagnosis     ICD-10-CM    1  DDD (degenerative disc disease), lumbar  M51 36           Start Time: 0800  Stop Time: 0900  Total time in clinic (min): 60 minutes    Subjective: Patient reports that he is having more pain in his low back  He feels that it is from driving  He reports pain is traveling into both legs and that the pain increases when he is sitting for longer than 1 hour and when he is standing for longer than 30 minutes  He notes that he has to constantly change positions because of the pain in his low back  Objective: See treatment diary below      Assessment: Tolerated treatment fair  Patient demonstrated fatigue post treatment and would benefit from continued PT Patient was challenged with progression of therapy interventions today which focused on improving the mobility of the lumbar spine  He noted some discomfort to the B/L shoulders due to being in a quadruped position and during physioball flexion due to UE support  Plan: Continue per plan of care  Progress treatment as tolerated  Precautions: Hx of Cervical fusion C5/6, C6/7    Date 3/7 IE 3/14       FOTO 37 SC       Manuals        Passive piriformis and hamstring stretch 30" 5x ea   30" 5x ea      Manual lumbar traction  30" 5x      Long axis traction to LEs  30" 3x ea              Neuro Re-Ed                                                                Ther Ex        NuStep  L4 5 min      Child's pose  5" 15x      Open Book  5" 10x ea      Seated PB flexion  5" 15x                                      Ther Activity        Angry cat  3" 15x              Gait Training                        Modalities

## 2023-03-17 ENCOUNTER — OFFICE VISIT (OUTPATIENT)
Dept: PHYSICAL THERAPY | Facility: CLINIC | Age: 63
End: 2023-03-17

## 2023-03-17 DIAGNOSIS — M51.36 DDD (DEGENERATIVE DISC DISEASE), LUMBAR: Primary | ICD-10-CM

## 2023-03-17 NOTE — PROGRESS NOTES
Daily Note     Today's date: 3/17/2023  Patient name: Naveed Smith  : 1960  MRN: [id number]  Referring provider: Naseem Hoff MD  Dx:   Encounter Diagnosis     ICD-10-CM    1  DDD (degenerative disc disease), lumbar  M51 36           Start Time: 0815  Stop Time: 0900  Total time in clinic (min): 45 minutes    Subjective: I am having a lot of pain in my low back today  The pain varies during the day  I get the pain into my left ribs at times  Objective: See treatment diary below      Assessment: Tolerated treatment fair  Patient would benefit from continued PT  Pt reports that he felt a bit less tight after using the Nustep today  Pt reports most soreness in his low back and mostly into his left hip  He was tight in both lower extremities today and had most soreness with his left piriformis stretch  He states that he felt less pain post    Plan: Continue per plan of care  Precautions: Hx of Cervical fusion C5/6, C6/7    Date 3/7 IE 3/14  3/17     FOTO 37 SC       Manuals        Passive piriformis and hamstring stretch 30" 5x ea   30" 5x ea 30 " 5 x ea     Manual lumbar traction  30" 5x NV       Long axis traction to LEs  30" 3x ea 30 " 3 x              Neuro Re-Ed                                                                Ther Ex        NuStep  L4 5 min L 4 5 min     Child's pose  5" 15x 5" 15 x     Open Book  5" 10x ea 5 " 10 x ea     Seated PB flexion  5" 15x 5 " 15 x                                      Ther Activity        Angry cat  3" 15x 3 " 10 X             Gait Training                        Modalities

## 2023-03-20 ENCOUNTER — OFFICE VISIT (OUTPATIENT)
Dept: PHYSICAL THERAPY | Facility: CLINIC | Age: 63
End: 2023-03-20

## 2023-03-20 DIAGNOSIS — M51.36 DDD (DEGENERATIVE DISC DISEASE), LUMBAR: Primary | ICD-10-CM

## 2023-03-20 NOTE — PROGRESS NOTES
Daily Note     Today's date: 3/20/2023  Patient name: Neno Graham  : 1960  MRN: [id number]  Referring provider: Ryan Pierce MD  Dx:   Encounter Diagnosis     ICD-10-CM    1  DDD (degenerative disc disease), lumbar  M51 36           Start Time: 0800  Stop Time: 0900  Total time in clinic (min): 60 minutes    Subjective: I am having the pain over my right low back  I didn't sleep well because of the pain in my back ,       Objective: See treatment diary below      Assessment: Tolerated treatment fair  Patient would benefit from continued PT   Pt was able to increase the time on the Nustep to 7 min with no change in pain levels  Pt reports having some moderate low back pain with the mat exercises today  He states that the pain was across his low back through out  He was able to perform all exercises today  Pt states feeling less painful in his low back post and feels the stretching helps   He did well with th new exercises today  Plan: Continue per plan of care  Precautions: Hx of Cervical fusion C5/6, C6/7    Date 3/7 IE 3/14  3/17 3/20    FOTO 37 SC       Manuals        Passive piriformis and hamstring stretch 30" 5x ea   30" 5x ea 30 " 5 x ea 30 " 5 x     Manual lumbar traction  30" 5x NV       Long axis traction to LEs  30" 3x ea 30 " 3 x  30 " 4 x             Neuro Re-Ed        PPT     3 " 15 x                                                     Ther Ex        NuStep  L4 5 min L 4 5 min L 5  7 min    Child's pose  5" 15x 5" 15 x 5 " 15 x     Open Book  5" 10x ea 5 " 10 x ea 5 " 10 x ea    Seated PB flexion  5" 15x 5 " 15 x  5 " 15 x     SKTC    5" 5 x each     treadmill     3 min 2 0 mph                    Ther Activity        Angry cat  3" 15x 3 " 10 X 5 " 10 x            Gait Training                        Modalities

## 2023-03-22 ENCOUNTER — OFFICE VISIT (OUTPATIENT)
Dept: PHYSICAL THERAPY | Facility: CLINIC | Age: 63
End: 2023-03-22

## 2023-03-22 DIAGNOSIS — M51.36 DDD (DEGENERATIVE DISC DISEASE), LUMBAR: Primary | ICD-10-CM

## 2023-03-22 NOTE — PROGRESS NOTES
Daily Note     Today's date: 3/22/2023  Patient name: Eitan Lundberg  : 1960  MRN: [id number]  Referring provider: Shelly Jameson MD  Dx:   Encounter Diagnosis     ICD-10-CM    1  DDD (degenerative disc disease), lumbar  M51 36           Start Time: 0800  Stop Time: 0910  Total time in clinic (min): 70 minutes    Subjective: I have trouble sleeping at night due to the pain  Objective: See treatment diary below      Assessment: Tolerated treatment well  Patient would benefit from continued PT  Pt reports having some pain in his low back with all exercises today, but was able to do all   Pt still has tightness in both lower extremities today  We began some new exercises with good tolerance  He needs some cues at times to do the exercises correctly, but, does well   He did not report any increase in back pain today with his back program  He does state having tightness in his back through out session today  Pt was able to increase the treadmill to 2 5 mph and increased time  Plan: Continue per plan of care  Precautions: Hx of Cervical fusion C5/6, C6/7    Date 3/7 IE 3/14  3/17 3/20 3/22   FOTO 37 SC    54 JF   Manuals        Passive piriformis and hamstring stretch 30" 5x ea   30" 5x ea 30 " 5 x ea 30 " 5 x  30 " 5x   Manual lumbar traction  30" 5x NV       Long axis traction to LEs  30" 3x ea 30 " 3 x  30 " 4 x  30" 4 x           Neuro Re-Ed        PPT     3 " 15 x  3 " 15 x    TB JUNIOR      Green 20 x   TB ROW     Blue  20 x                                    Ther Ex        NuStep  L4 5 min L 4 5 min L 5  7 min L5  7 min   Child's pose  5" 15x 5" 15 x 5 " 15 x  5 " 15 x    Open Book  5" 10x ea 5 " 10 x ea 5 " 10 x ea 5 " 10 x   Seated PB flexion  5" 15x 5 " 15 x  5 " 15 x  5 " 15 x    SKTC    5" 5 x each  5 " 15 x    treadmill     3 min 2 0 mph 3 min 2 0 mph    Bird dog     NV           Ther Activity        Angry cat  3" 15x 3 " 10 X 5 " 10 x 5 " 10 x            Gait Training Modalities

## 2023-03-27 ENCOUNTER — APPOINTMENT (OUTPATIENT)
Dept: PHYSICAL THERAPY | Facility: CLINIC | Age: 63
End: 2023-03-27

## 2023-03-29 ENCOUNTER — OFFICE VISIT (OUTPATIENT)
Dept: PHYSICAL THERAPY | Facility: CLINIC | Age: 63
End: 2023-03-29

## 2023-03-29 DIAGNOSIS — M51.36 DDD (DEGENERATIVE DISC DISEASE), LUMBAR: Primary | ICD-10-CM

## 2023-03-29 NOTE — PROGRESS NOTES
"Daily Note     Today's date: 3/29/2023  Patient name: Danielle Murillo  : 1960  MRN: [id number]  Referring provider: Wild Mays MD  Dx:   Encounter Diagnosis     ICD-10-CM    1  DDD (degenerative disc disease), lumbar  M51 36           Start Time: 835  Stop Time: 905  Total time in clinic (min): 58 minutes    Subjective: I feel some improvement   I dont have much pain right now  Objective: See treatment diary below      Assessment: Tolerated treatment well  Patient would benefit from continued PT pt was able to complete his program today with some mild low back pain and tightness  He was able to increase reps for T band standing exercises and increased his time and speed on the treadmill today with good tolerance  He is still tight in his hamstrings and hips today  He feels the exercises and stretching is helping   He states that he will see the DR next week and talk about possible surgery on his back  Plan: Continue per plan of care  Precautions: Hx of Cervical fusion C5/6, C6/7    Date 3/29 3/14  3/17 3/20 3/22   FOTO     54 JF   Manuals        Passive piriformis and hamstring stretch 30\" 5x ea   30\" 5x ea 30 \" 5 x ea 30 \" 5 x  30 \" 5x   Manual lumbar traction  30\" 5x NV       Long axis traction to LEs 30 \" 3 x ea 30\" 3x ea 30 \" 3 x  30 \" 4 x  30\" 4 x           Neuro Re-Ed        PPT  3 \" 15 x    3 \" 15 x  3 \" 15 x    TB JUNIOR  Green  30 x    Green 20 x   TB ROW Blue  30 x     Blue  20 x                                    Ther Ex        NuStep L 5 7 min L4 5 min L 4 5 min L 5  7 min L5  7 min   Child's pose 5 \" 10 x  5\" 15x 5\" 15 x 5 \" 15 x  5 \" 15 x    Open Book 5 \" 10 x 5\" 10x ea 5 \" 10 x ea 5 \" 10 x ea 5 \" 10 x   Seated PB flexion 5 \" 15 x  5\" 15x 5 \" 15 x  5 \" 15 x  5 \" 15 x    SKTC 5 \" 15 x    5\" 5 x each  5 \" 15 x    treadmill 5 min  2 5 mph    3 min 2 0 mph 3 min 2 0 mph    Bird dog 10 x     NV           Ther Activity        Angry cat 3\" 15 x  3\" 15x 3 \" 10 X 5 \" 10 x 5 \" 10 " x            Gait Training                        Modalities

## 2023-04-04 ENCOUNTER — OFFICE VISIT (OUTPATIENT)
Dept: PHYSICAL THERAPY | Facility: CLINIC | Age: 63
End: 2023-04-04

## 2023-04-04 DIAGNOSIS — M51.36 DDD (DEGENERATIVE DISC DISEASE), LUMBAR: Primary | ICD-10-CM

## 2023-04-04 NOTE — PROGRESS NOTES
"Daily Note     Today's date: 2023  Patient name: Pineda Castano  : 1960  MRN: [id number]  Referring provider: Stoney Bello MD  Dx:   Encounter Diagnosis     ICD-10-CM    1  DDD (degenerative disc disease), lumbar  M51 36           Start Time: 1000  Stop Time: 1100  Total time in clinic (min): 60 minutes    Subjective: I have a lot of pain today  I have pain in my back and it goes down my left leg to my calf  Objective: See treatment diary below      Assessment: Tolerated treatment fair  Patient would benefit from continued PT  Pt was able to do all mat exercises today with no increase in left leg pain  We held on the treadmill today due to left leg pain  We also held on the band exercises due to the pt reporting having pain in both shoulders and was mostly in his left shoulder  He states that his left leg pain was about the same post therapy today  He had good tolerance with long leg traction  Plan: Continue per plan of care  Precautions: Hx of Cervical fusion C5/6, C6/7    Date 3/29 4/4 3/17 3/20 3/22   FOTO     54 JF   Manuals        Passive piriformis and hamstring stretch 30\" 5x ea   30\" 5x ea 30 \" 5 x ea 30 \" 5 x  30 \" 5x   Manual lumbar traction  30\" 5x NV       Long axis traction to LEs 30 \" 3 x ea 30\" 3x ea 30 \" 3 x  30 \" 4 x  30\" 4 x           Neuro Re-Ed        PPT  3 \" 15 x  5 \" 15x   3 \" 15 x  3 \" 15 x    TB JUNIOR  Green  30 x Green 30 x   Green 20 x   TB ROW Blue  30 x  Blue 30 x   Blue  20 x                                    Ther Ex        NuStep L 5 7 min L4  7 min L 4 5 min L 5  7 min L5  7 min   Child's pose 5 \" 10 x  5\" 10 x 5\" 15 x 5 \" 15 x  5 \" 15 x    Open Book 5 \" 10 x 5\" 10x ea 5 \" 10 x ea 5 \" 10 x ea 5 \" 10 x   Seated PB flexion 5 \" 15 x  5\" 15x 5 \" 15 x  5 \" 15 x  5 \" 15 x    SKTC 5 \" 15 x  5 \" 15 x  5\" 5 x each  5 \" 15 x    treadmill 5 min  2 5 mph    3 min 2 0 mph 3 min 2 0 mph    Bird dog 10 x  10 x    NV           Ther Activity        Angry cat 3\" 15 " "x  3\" 15x 3 \" 10 X 5 \" 10 x 5 \" 10 x            Gait Training                        Modalities                                           "

## 2023-04-06 ENCOUNTER — OFFICE VISIT (OUTPATIENT)
Dept: PHYSICAL THERAPY | Facility: CLINIC | Age: 63
End: 2023-04-06

## 2023-04-06 DIAGNOSIS — M51.36 DDD (DEGENERATIVE DISC DISEASE), LUMBAR: Primary | ICD-10-CM

## 2023-04-06 NOTE — PROGRESS NOTES
"Daily Note     Today's date: 2023  Patient name: Grace Quarles  : 1960  MRN: [id number]  Referring provider: Patricia Mayen MD  Dx:   Encounter Diagnosis     ICD-10-CM    1  DDD (degenerative disc disease), lumbar  M51 36           Start Time: 0815  Stop Time: 0900  Total time in clinic (min): 45 minutes    Subjective: Pt comments on min long term relief of his LBP  Objective: See treatment diary below      Assessment: Tolerated treatment well  Patient exhibited good technique with therapeutic exercises   Long axis distraction was not provided due to pt lateness  Slight relief of sx after tx  Plan: Continue per plan of care  Precautions: Hx of Cervical fusion C5/6, C6/7    Date 3/29 4/4 4/6 3/20 3/22   FOTO     54 JF   Manuals        Passive piriformis and hamstring stretch 30\" 5x ea  30\" 5x ea 30 \" 5 x ea 30 \" 5 x  30 \" 5x   Manual lumbar traction  30\" 5x np     Long axis traction to LEs 30 \" 3 x ea 30\" 3x ea np 30 \" 4 x  30\" 4 x   Neuro Re-Ed        PPT  3 \" 15 x  5 \" 15x  15x 5\" 3 \" 15 x  3 \" 15 x    TB JUNIOR  Green  30 x Green 30 x L4 30x  Green 20 x   TB ROW Blue  30 x  Blue 30 x L4 30x  Blue  20 x                            Ther Ex        NuStep L 5 7 min L4  7 min L4 8m L 5  7 min L5  7 min   Child's pose 5 \" 10 x  5\" 10 x 5\" 15 x 5 \" 15 x  5 \" 15 x    Open Book 5 \" 10 x 5\" 10x ea 5 \" 10 x ea 5 \" 10 x ea 5 \" 10 x   Seated PB flexion 5 \" 15 x  5\" 15x 5 \" 15 x  5 \" 15 x  5 \" 15 x    SKTC 5 \" 15 x  5 \" 15 x 15x 5\" 5\" 5 x each  5 \" 15 x    treadmill 5 min  2 5 mph  5m 2  5mph  3 min 2 0 mph 3 min 2 0 mph    Bird dog 10 x  10 x  10x  NV           Ther Activity        Angry cat 3\" 15 x  3\" 15x 15x 3\" 5 \" 10 x 5 \" 10 x            Gait Training                        Modalities                                             "

## 2023-04-07 ENCOUNTER — OFFICE VISIT (OUTPATIENT)
Dept: PHYSICAL THERAPY | Facility: CLINIC | Age: 63
End: 2023-04-07

## 2023-04-07 DIAGNOSIS — M51.36 DDD (DEGENERATIVE DISC DISEASE), LUMBAR: Primary | ICD-10-CM

## 2023-04-07 NOTE — PROGRESS NOTES
"Daily Note     Today's date: 2023  Patient name: Jody Santos  : 1960  MRN: [id number]  Referring provider: Tiffanie Branham MD  Dx:   Encounter Diagnosis     ICD-10-CM    1  DDD (degenerative disc disease), lumbar  M51 36           Start Time: 0700  Stop Time: 0800  Total time in clinic (min): 60 minutes    Subjective: My low back is mild to moderate pain today  I have most trouble with standing and walking too much  I can walk for about 15 min before the pain increases  Objective: See treatment diary below      Assessment: Tolerated treatment well  Patient would benefit from continued PT   Pt began cable side stepping for press out five times each way  He needed a few visual cues to complete the exercise correctly  Pt reports having some mild pain in his low back to begin ,but, felt better post with less pain and improved motion  Pt reports that he was unable to extend his arms with open book stretch due to pain in both shoulders  He feels he is improving some   Plan: Continue per plan of care  Precautions: Hx of Cervical fusion C5/6, C6/7    Date 3/29 4/4 4/6 4/7 3/22   FOTO     54 JF   Manuals        Passive piriformis and hamstring stretch 30\" 5x ea   30\" 5x ea 30 \" 5 x ea 30 \" 5 x  30 \" 5x   Manual lumbar traction  30\" 5x np     Long axis traction to LEs 30 \" 3 x ea 30\" 3x ea np 30 \" 4 x  30\" 4 x   Neuro Re-Ed        PPT  3 \" 15 x  5 \" 15x  15x 5\" 3 \" 15 x  3 \" 15 x    TB JUNIOR  Green  30 x Green 30 x L4 30x L 4 30 x Green 20 x   TB ROW Blue  30 x  Blue 30 x L4 30x L 4 30 x Blue  20 x    PA press     5 x each with step                    Ther Ex        NuStep L 5 7 min L4  7 min L4 8m L 5  7 min L5  7 min   Child's pose 5 \" 10 x  5\" 10 x 5\" 15 x 5 \" 15 x  5 \" 15 x    Open Book 5 \" 10 x 5\" 10x ea 5 \" 10 x ea 5 \" 10 x ea 5 \" 10 x   Seated PB flexion 5 \" 15 x  5\" 15x 5 \" 15 x  5 \" 15 x  5 \" 15 x    SKTC 5 \" 15 x  5 \" 15 x 15x 5\" 5\" 5 x each  5 \" 15 x    treadmill 5 min  2 5 mph  5m " "2 5mph  3 min 2 0 mph 5 min around the clinic   Bird dog 10 x  10 x  10x  NV           Ther Activity        Angry cat 3\" 15 x  3\" 15x 15x 3\" 5 \" 10 x 5 \" 10 x            Gait Training                        Modalities                                               "

## 2023-04-24 ENCOUNTER — OFFICE VISIT (OUTPATIENT)
Dept: PHYSICAL THERAPY | Facility: CLINIC | Age: 63
End: 2023-04-24

## 2023-04-24 DIAGNOSIS — M51.36 DDD (DEGENERATIVE DISC DISEASE), LUMBAR: Primary | ICD-10-CM

## 2023-04-24 NOTE — PROGRESS NOTES
"Daily Note     Today's date: 2023  Patient name: Candace Villagomez  : 1960  MRN: [id number]  Referring provider: Juany Albert MD  Dx:   Encounter Diagnosis     ICD-10-CM    1  DDD (degenerative disc disease), lumbar  M51 36           Start Time: 08  Stop Time: 0855  Total time in clinic (min): 50 minutes    Subjective: I am sore today  I went fishing yesterday  I have pain into my right leg today  Objective: See treatment diary below      Assessment: Tolerated treatment well  Patient would benefit from continued PT  Pt reports having some pain in his back and right leg through out his program today ,  He did state feeling a bit less tightness and pain in his back and right knee post  He declined doing the treadmill today due to the fact that his right knee was painful  He states that he will due it the next visit if he is able  Plan: Continue per plan of care  Precautions: Hx of Cervical fusion C5/6, C6/7    Date  Reassess   FOTO     56 SC   Manuals        Passive piriformis and hamstring stretch 30\" 5x ea  30\" 5x ea 30 \" 5 x ea 30 \" 5 x  30\" 5x   Manual lumbar traction   np     Long axis traction to LEs   np  30\" 4x   Neuro Re-Ed        PPT  3 \" 20 x  3\" 20x 15x 5\" 5 \" 15 x  5\" 15x   TB JUNIOR  blue  30 x Blue 30x L4 30x L 4 30 x Blue in tandem 2x15   TB ROW Blue  30 x  Blue 30x L4 30x L 4 30 x Blue in tandem 2x15   Pallof press  W / walkout P2 5 x each W/walkout P2  5x ea W / walkout P2  5 x 5 x each with step  P 2 W/ walkout P2 5x ea                   Ther Ex        NuStep L 5 7 min L5 x 8 min L4 8m L 5  5 min L5 5 min   Child's pose 5 \" 10 x  5\" 10x 5\" 15 x 5 \" 15 x  5\" 15x   Open Book 5 \" 10 x 10\" 5x 5 \" 10 x ea 5 \" 10 x ea 5\" 10x ea   treadmill 5 min  2 5 to 3 0  2 5 to 3 0 mph x 5 min 5m 2  5mph  3 min 2 0 mph 2 0mph 5 min   Bird dog 10 x  10x 10x 10 x            Ther Activity        Angry cat 5\" 15 x  5\" 15x 15x 3\" 5 \" 10 x 5\" 10x           Gait Training " Modalities

## 2023-04-26 ENCOUNTER — OFFICE VISIT (OUTPATIENT)
Dept: PHYSICAL THERAPY | Facility: CLINIC | Age: 63
End: 2023-04-26

## 2023-04-26 DIAGNOSIS — M51.36 DDD (DEGENERATIVE DISC DISEASE), LUMBAR: Primary | ICD-10-CM

## 2023-04-26 NOTE — PROGRESS NOTES
"Daily Note     Today's date: 2023  Patient name: Sarah Cabezas  : 1960  MRN: [id number]  Referring provider: Jason Hough MD  Dx:   Encounter Diagnosis     ICD-10-CM    1  DDD (degenerative disc disease), lumbar  M51 36           Start Time: 1400  Stop Time: 1500  Total time in clinic (min): 60 minutes    Subjective: My back is feeling much better today than my last visit  Objective: See treatment diary below      Assessment: Tolerated treatment well  Patient would benefit from continued PT      Plan: Continue per plan of care  Precautions: Hx of Cervical fusion C5/6, C6/7    Date    FOTO        Manuals        Passive piriformis and hamstring stretch 30\" 5x ea  30\" 5x ea 30 \" 5 x ea 30 \" 5 x  30\" 5x   Manual lumbar traction   np     Long axis traction to LEs   np     Neuro Re-Ed        PPT  3 \" 20 x  3\" 20x 15x 5\" 5 \" 15 x  5\" 15x   TB JUNIOR  blue  30 x Blue 30x L4 30x L 4 30 x P 3 tandem  30x   TB ROW Blue  30 x  Blue 30x L4 30x L 4 30 x P 4  tandem 2x15   Pallof press  W / walkout P2 5 x each W/walkout P2  5x ea W / walkout P2  5 x 5 x each with step  P 2 W/ walkout P2 5x ea                   Ther Ex        NuStep L 5 7 min L5 x 8 min L4 8m L 5  5 min L5 5 min   Child's pose 5 \" 10 x  5\" 10x 5\" 15 x 5 \" 15 x  5\" 15x   Open Book 5 \" 10 x 10\" 5x 5 \" 10 x ea 5 \" 10 x ea 5\" 10x ea   treadmill 5 min  2 5 to 3 0  2 5 to 3 0 mph x 5 min 5m 2  5mph  3 min 2 0 mph 2 5 mph 5 min   Bird dog 10 x  10x 10x 10 x            Ther Activity        Angry cat 5\" 15 x  5\" 15x 15x 3\" 5 \" 10 x 5\" 10x           Gait Training                        Modalities                                         "

## 2023-05-01 ENCOUNTER — OFFICE VISIT (OUTPATIENT)
Dept: PHYSICAL THERAPY | Facility: CLINIC | Age: 63
End: 2023-05-01

## 2023-05-01 DIAGNOSIS — M51.36 DDD (DEGENERATIVE DISC DISEASE), LUMBAR: Primary | ICD-10-CM

## 2023-05-01 NOTE — PROGRESS NOTES
"Daily Note     Today's date: 2023  Patient name: Minda Killian  : 1960  MRN: [id number]  Referring provider: Hal Orta MD  Dx:   Encounter Diagnosis     ICD-10-CM    1  DDD (degenerative disc disease), lumbar  M51 36           Start Time: 1000  Stop Time: 1100  Total time in clinic (min): 60 minutes    Subjective: Pt continues with R sided lumbar discomfort  Objective: See treatment diary below      Assessment: Tolerated treatment well  Patient exhibited good technique with therapeutic exercises      Plan: Continue per plan of care  Precautions: Hx of Cervical fusion C5/6, C6/7    Date    FOTO        Manuals        Passive piriformis and hamstring stretch 30\" 5x ea  + gastroc 30\" 5x ea 30 \" 5 x ea 30 \" 5 x  30\" 5x   Manual lumbar traction   np     Long axis traction to LEs   np     Neuro Re-Ed        PPT  20x 5\" 3\" 20x 15x 5\" 5 \" 15 x  5\" 15x    JUNIOR /ROW-tandem P3 30x Blue 30x L4 30x L 4 30 x P 3 tandem  30x   Pallof press c walkout  P2 5x ea W/walkout P2  5x ea W / walkout P2  5 x 5 x each with step  P 2 W/ walkout P2 5x ea                   Ther Ex        NuStep 8m L5 L5 x 8 min L4 8m L 5  5 min L5 5 min   Child's pose 15x 5\" 5\" 10x 5\" 15 x 5 \" 15 x  5\" 15x   Open Book 10x 5\" 10\" 5x 5 \" 10 x ea 5 \" 10 x ea 5\" 10x ea   Treadmill defer 2 5 to 3 0 mph x 5 min 5m 2  5mph  3 min 2 0 mph 2 5 mph 5 min   Bird dog 10x 10x 10x 10 x            Ther Activity        Angry cat 15x 5\" 5\" 15x 15x 3\" 5 \" 10 x 5\" 10x           Gait Training                Modalities                                           "

## 2023-05-04 ENCOUNTER — OFFICE VISIT (OUTPATIENT)
Dept: PHYSICAL THERAPY | Facility: CLINIC | Age: 63
End: 2023-05-04

## 2023-05-04 DIAGNOSIS — M51.36 DDD (DEGENERATIVE DISC DISEASE), LUMBAR: Primary | ICD-10-CM

## 2023-05-04 NOTE — PROGRESS NOTES
"Daily Note     Today's date: 2023  Patient name: Brandon Awad  : 1960  MRN: [id number]  Referring provider: Willi Polanco MD  Dx:   Encounter Diagnosis     ICD-10-CM    1  DDD (degenerative disc disease), lumbar  M51 36           Start Time: 1400  Stop Time: 1500  Total time in clinic (min): 60 minutes    Subjective: My back is sore today and my left knee  Objective: See treatment diary below      Assessment: Tolerated treatment fair  Patient would benefit from continued PT   We held on the treadmill today at the patients request  He states that his back was more sore  He was able to do all other exercises today with pain   He states that the stretching helped to loosen his right hip today  He continues to work on his exercises at home  We ended with a hot pack to his low back for 10 min seated  Plan: Continue per plan of care  Precautions: Hx of Cervical fusion C5/6, C6/7    Date    FOTO        Manuals        Passive piriformis and hamstring stretch 30\" 5x ea  + gastroc 30\" 5x ea 30 \" 5 x ea 30 \" 5 x  30\" 5x   Manual lumbar traction   np     Long axis traction to LEs   np     Neuro Re-Ed        PPT  20x 5\" 5\" 20x 15x 5\" 5 \" 15 x  5\" 15x    JUNIOR /ROW-tandem P3 30x P 3 30 x  JUNIOR  P 4 row  30 x L4 30x L 4 30 x P 3 tandem  30x   Pallof press c walkout  P2 5x ea P 2  15x time each with press out W / walkout P2  5 x 5 x each with step  P 2 W/ walkout P2 5x ea                   Ther Ex        NuStep 8m L5 L5 x 8 min L4 8m L 5  5 min L5 5 min   Child's pose 15x 5\" 5\" 10x 5\" 15 x 5 \" 15 x  5\" 15x   Open Book 10x 5\" 10\" 5x 5 \" 10 x ea 5 \" 10 x ea 5\" 10x ea   Treadmill defer 2 5 to 3 0 mph x 5 min   Held today pain 5m 2  5mph  3 min 2 0 mph 2 5 mph 5 min   Bird dog 10x 10x 10x 10 x            Ther Activity        Angry cat 15x 5\" 5\" 15x 15x 3\" 5 \" 10 x 5\" 10x           Gait Training                Modalities  10 min HP                                           "

## 2023-05-08 ENCOUNTER — OFFICE VISIT (OUTPATIENT)
Dept: PHYSICAL THERAPY | Facility: CLINIC | Age: 63
End: 2023-05-08

## 2023-05-08 DIAGNOSIS — M51.36 DDD (DEGENERATIVE DISC DISEASE), LUMBAR: Primary | ICD-10-CM

## 2023-05-08 NOTE — PROGRESS NOTES
"Daily Note     Today's date: 2023  Patient name: Salma Choudhary  : 1960  MRN: [id number]  Referring provider: Radhika Dumont MD  Dx:   Encounter Diagnosis     ICD-10-CM    1  DDD (degenerative disc disease), lumbar  M51 36           Start Time: 1100  Stop Time: 1200  Total time in clinic (min): 60 minutes    Subjective: Pt with min c/o today  Objective: See treatment diary below      Assessment: Tolerated treatment well  Patient exhibited good technique with therapeutic exercises  He continues to struggle with the bird dog exercise  Plan: Continue per plan of care  Precautions: Hx of Cervical fusion C5/6, C6/7    Date    FOTO   Ds 56     Manuals        Passive piriformis and hamstring stretch 30\" 5x ea  + gastroc 30\" 5x ea 30 \" 5 x ea 30 \" 5 x  30\" 5x   Manual lumbar traction        Long axis traction to LEs        Neuro Re-Ed        PPT  20x 5\" 5\" 20x 15x 5\" 5 \" 15 x  5\" 15x    JUNIOR /ROW-tandem P3 30x P 3 30 x  JUNIOR  P 4 row  30 x L4 30x L 4 30 x P 3 tandem  30x   Pallof press c walkout  P2 5x ea P 2  15x time each with press out 5x ea  5 x each with step  P 2 W/ walkout P2 5x ea   Triceps   P4 30x              Ther Ex        NuStep 8m L5 L5 x 8 min 8m L7 L 5  5 min L5 5 min   Child's pose 15x 5\" 5\" 10x 10x 5\" 5 \" 15 x  5\" 15x   Open Book 10x 5\" 10x 5\" 10x 5\" 5 \" 10 x ea 5\" 10x ea   Treadmill defer 2 5 to 3 0 mph x 5 min   Held today pain 5m 2 5 mph  3 min 2 0 mph 2 5 mph 5 min   Bird dog 10x 10x 10x 10 x            Ther Activity        Angry cat 15x 5\" 5\" 15x 15x 3\" 5 \" 10 x 5\" 10x           Gait Training                Modalities        HP  10m defer                                    "

## 2023-05-11 ENCOUNTER — OFFICE VISIT (OUTPATIENT)
Dept: PHYSICAL THERAPY | Facility: CLINIC | Age: 63
End: 2023-05-11

## 2023-05-11 DIAGNOSIS — M51.36 DDD (DEGENERATIVE DISC DISEASE), LUMBAR: Primary | ICD-10-CM

## 2023-05-11 NOTE — PROGRESS NOTES
"Daily Note     Today's date: 2023  Patient name: Dilan Romo   : 1960  MRN: [id number]  Referring provider: Marcio Haynes MD  Dx:   Encounter Diagnosis     ICD-10-CM    1  DDD (degenerative disc disease), lumbar  M51 36           Start Time: 1300  Stop Time: 1400  Total time in clinic (min): 60 minutes    Subjective: Patient reports having back pain and rates it as 5/10  Objective: See treatment diary below      Assessment: Tolerated treatment well  Patient participated in skilled PT session focused on strengthening, stretching, and ROM  Patient able to complete all exercises with a decrease in pain after manual stretches  Patient demonstrates some guarding with trunk stretching exercises with rotation  Patient continues with R side low back  Patient would continue to benefit from skilled PT interventions to address strengthening, stretching, and ROM  Patient demonstrated fatigue post treatment and exhibited good technique with therapeutic exercises      Plan: Continue per plan of care  Precautions: Hx of Cervical fusion C5/6, C6/7    Date    FOTO   Ds 56     Manuals        Passive piriformis and hamstring stretch 30\" 5x ea  + gastroc 30\" 5x ea 30 \" 5 x ea  30\" 5x   Manual lumbar traction        Long axis traction to LEs        Neuro Re-Ed        PPT  20x 5\" 5\" 20x 15x 5\" 5\" 20x 5\" 15x    JUNIOR /ROW-tandem P3 30x P 3 30 x  JUNIOR  P 4 row  30 x L4 30x P3 30x JUNIOR  P4 30x Row P 3 tandem  30x   Pallof press c walkout  P2 5x ea P 2  15x time each with press out 5x ea  P2 5x ea with press out W/ walkout P2 5x ea   Triceps   P4 30x  P4 30x            Ther Ex        NuStep 8m L5 L5 x 8 min 8m L7 Bike L1 x 8 min L5 5 min   Child's pose 15x 5\" 5\" 10x 10x 5\" 5\" 10x 5\" 15x   Open Book 10x 5\" 10x 5\" 10x 5\" 5\" 10x 5\" 10x ea   Treadmill defer 2 5 to 3 0 mph x 5 min   Held today pain 5m 2 5 mph Declined 2 5 mph 5 min   Bird dog 10x 10x 10x 10x            Ther Activity      " "  Angry cat 15x 5\" 5\" 15x 15x 3\" 3\" 15x 5\" 10x           Gait Training                Modalities        HP  10m defer                                      "

## 2023-05-17 ENCOUNTER — OFFICE VISIT (OUTPATIENT)
Dept: PHYSICAL THERAPY | Facility: CLINIC | Age: 63
End: 2023-05-17

## 2023-05-17 DIAGNOSIS — M51.36 DDD (DEGENERATIVE DISC DISEASE), LUMBAR: Primary | ICD-10-CM

## 2023-05-19 ENCOUNTER — OFFICE VISIT (OUTPATIENT)
Dept: PHYSICAL THERAPY | Facility: CLINIC | Age: 63
End: 2023-05-19

## 2023-05-19 DIAGNOSIS — M51.36 DDD (DEGENERATIVE DISC DISEASE), LUMBAR: Primary | ICD-10-CM

## 2023-05-19 NOTE — PROGRESS NOTES
"Daily Note     Today's date: 2023  Patient name: Samir Esteves  : 1960  MRN: [id number]  Referring provider: Abe Diggs MD  Dx:   Encounter Diagnosis     ICD-10-CM    1  DDD (degenerative disc disease), lumbar  M51 36           Start Time: 0800  Stop Time: 0900  Total time in clinic (min): 60 minutes    Subjective: Pt notes increased discomfort today  Objective: See treatment diary below      Assessment: Tolerated treatment well  Patient exhibited good technique with therapeutic exercises      Plan: Continue per plan of care  Precautions: Hx of Cervical fusion C5/6, C6/7    Date    FOTO   Ds 56     Manuals        Passive piriformis and hamstring stretch 30\" 5x ea  + gastroc 30\" 5x ea 30 \" 5 x ea  ds   Manual lumbar traction        Long axis traction to LEs        Neuro Re-Ed        PPT  20x 5\" 5\" 20x 15x 5\" 5\" 20x 5\" 15x    JUNIOR /ROW-tandem P4 30x P 3 30 x  JUNIOR  P 4 row  30 x L4 30x P3 30x JUNIOR  P4 30x Row P4  30x ea   Pallof press c walkout  P2 5x ea c press out P 2  15x time each with press out 5x ea  P2 5x ea with press out P2 5x ea c press out     Triceps P4 30x  P4 30x  P4 30x P4 30x           Ther Ex        NuStep 8m L5 L5 x 8 min 8m L7 Bike L1 x 8 min 8m L5   Child's pose 15x 5\" 5\" 10x 10x 5\" 5\" 10x 10x 5\"   Open Book 10x 5\" 10x 5\" 10x 5\" 5\" 10x 10x 5\"   Treadmill 5m 2 0-2 7 2 5 to 3 0 mph x 5 min   Held today pain 5m 2 5 mph Declined 5m 2 0-2 8   Bird dog 10x 10x 10x 10x 10x           Ther Activity        Angry cat 15x 5\" 5\" 15x 15x 3\" 3\" 15x 15x 3\"           Gait Training                Modalities        HP 10m seated 10m defer  defer                                        "

## 2024-02-21 PROBLEM — Z12.5 PROSTATE CANCER SCREENING: Status: RESOLVED | Noted: 2018-02-23 | Resolved: 2024-02-21

## 2024-05-06 ENCOUNTER — OFFICE VISIT (OUTPATIENT)
Dept: OBGYN CLINIC | Facility: CLINIC | Age: 64
End: 2024-05-06
Payer: COMMERCIAL

## 2024-05-06 ENCOUNTER — APPOINTMENT (OUTPATIENT)
Dept: RADIOLOGY | Facility: CLINIC | Age: 64
End: 2024-05-06
Payer: COMMERCIAL

## 2024-05-06 VITALS — HEIGHT: 72 IN | BODY MASS INDEX: 28.04 KG/M2 | WEIGHT: 207 LBS

## 2024-05-06 DIAGNOSIS — M54.50 CHRONIC BILATERAL LOW BACK PAIN WITHOUT SCIATICA: Primary | ICD-10-CM

## 2024-05-06 DIAGNOSIS — G89.29 CHRONIC BILATERAL LOW BACK PAIN WITHOUT SCIATICA: Primary | ICD-10-CM

## 2024-05-06 DIAGNOSIS — M54.50 LOW BACK PAIN, UNSPECIFIED BACK PAIN LATERALITY, UNSPECIFIED CHRONICITY, UNSPECIFIED WHETHER SCIATICA PRESENT: ICD-10-CM

## 2024-05-06 DIAGNOSIS — M51.36 DDD (DEGENERATIVE DISC DISEASE), LUMBAR: ICD-10-CM

## 2024-05-06 PROCEDURE — 99204 OFFICE O/P NEW MOD 45 MIN: CPT | Performed by: ORTHOPAEDIC SURGERY

## 2024-05-06 PROCEDURE — 72110 X-RAY EXAM L-2 SPINE 4/>VWS: CPT

## 2024-05-06 RX ORDER — TAMSULOSIN HYDROCHLORIDE 0.4 MG/1
0.4 CAPSULE ORAL DAILY
COMMUNITY
Start: 2024-03-25

## 2024-05-06 RX ORDER — SEMAGLUTIDE 0.68 MG/ML
0.5 INJECTION, SOLUTION SUBCUTANEOUS
COMMUNITY
Start: 2024-04-30

## 2024-05-06 RX ORDER — LINACLOTIDE 145 UG/1
145 CAPSULE, GELATIN COATED ORAL DAILY
COMMUNITY
Start: 2024-01-31

## 2024-05-06 RX ORDER — ASPIRIN 81 MG/1
81 TABLET ORAL DAILY
COMMUNITY

## 2024-05-06 RX ORDER — DULAGLUTIDE 0.75 MG/.5ML
INJECTION, SOLUTION SUBCUTANEOUS
COMMUNITY
Start: 2024-02-26

## 2024-05-06 RX ORDER — TROSPIUM CHLORIDE 20 MG/1
20 TABLET, FILM COATED ORAL DAILY
COMMUNITY
Start: 2024-04-30 | End: 2025-04-30

## 2024-05-06 NOTE — PROGRESS NOTES
[facility] ORTHOPEDIC SPINE SURGERY  DR.AMIR KENAN MD  [address] [address] [phone]    HISTORY OF PRESENT ILLNESS:    Real Kay is a 64 y.o. male who presents for initial evaluation of lumbar spine. Patient reports he has pain in the low back that has been present for over one year. Patient reports pain goes down the anterior and lateral aspect of bilateral lower extremities ending at the feet. Patient reports pain worsens with standing and walking. Patient states he can only walk for a few minutes at a time. Patient mentions he was supposed to have surgery with Dr. Cortez at [facility] in January. However, the surgery was canceled due to insurance not covering in it. Patient has history of cervical surgery at [facility] a few years ago. Patient denies having spinal injections. Patient did a course of physical therapy last year.       ALLERGIES: No Known Allergies    MEDICATIONS:    Current Outpatient Medications:     acetaminophen (TYLENOL) 325 mg tablet, Take 650 mg by mouth every 6 (six) hours as needed for mild pain 2 tabs, Disp: , Rfl:     aspirin (ECOTRIN LOW STRENGTH) 81 mg EC tablet, Take 81 mg by mouth daily, Disp: , Rfl:     gabapentin (NEURONTIN) 600 MG tablet, Take 600 mg by mouth 3 (three) times a day, Disp: , Rfl: 3    glipiZIDE (GLUCOTROL XL) 10 mg 24 hr tablet, Take 10 mg by mouth daily, Disp: , Rfl: 5    HYDROcodone-acetaminophen (NORCO) 5-325 mg per tablet, Take 2 tablets by mouth every 6 (six) hours as needed for pain for up to 40 doses Max Daily Amount: 8 tablets, Disp: 40 tablet, Rfl: 0    Linzess 145 MCG CAPS, Take 145 mcg by mouth daily, Disp: , Rfl:     lisinopril (ZESTRIL) 10 mg tablet, Take 10 mg by mouth daily, Disp: , Rfl:     omeprazole (PriLOSEC) 40 MG capsule, Take 40 mg by mouth, Disp: , Rfl:     Ozempic, 0.25 or 0.5 MG/DOSE, 2 MG/3ML injection pen, Inject 0.5 mg under the skin, Disp: , Rfl:     sildenafil (REVATIO) 20 mg tablet, Take 1 tablet (20 mg  total) by mouth as needed (on demand, 1 hr prior to ntercourse), Disp: 30 tablet, Rfl: 0    tamsulosin (FLOMAX) 0.4 mg, Take 0.4 mg by mouth daily, Disp: , Rfl:     traMADol (ULTRAM) 50 mg tablet, Take 50 mg by mouth every 6 (six) hours as needed for moderate pain, Disp: , Rfl:     trospium chloride (SANCTURA) 20 mg tablet, Take 20 mg by mouth daily, Disp: , Rfl:     Trulicity 0.75 MG/0.5ML injection, INJECT 0.75 MG SUBCUTANEOUSLY ONE TIME PER WEEK, Disp: , Rfl:     ACCU-CHEK DIEGO PLUS test strip, 2 (two) times a day Test blood sugar (Patient not taking: Reported on 5/6/2024), Disp: , Rfl: 5    docusate sodium (COLACE) 100 mg capsule, Take 1 capsule by mouth 2 (two) times a day for 30 days, Disp: 60 capsule, Rfl: 0    glipiZIDE (GLUCOTROL) 10 mg tablet, Take 10 mg by mouth daily, Disp: , Rfl:     JANUMET -1000 MG TB24, TAKE 1 TABLET BY MOUTH EVERY DAY WITH EVENING MEAL (Patient not taking: Reported on 5/6/2024), Disp: , Rfl: 5    oxyCODONE-acetaminophen (PERCOCET) 5-325 mg per tablet, Take 1-2 tablets by mouth every 4 (four) hours (Patient not taking: Reported on 5/6/2024), Disp: , Rfl:     sitaGLIPtin (JANUVIA) 100 mg tablet, Take 100 mg by mouth daily, Disp: , Rfl:     TiZANidine (ZANAFLEX) 4 MG capsule, Take 4 mg by mouth daily (Patient not taking: Reported on 5/6/2024), Disp: , Rfl:      PAST MEDICAL HISTORY:   Past Medical History:   Diagnosis Date    Diabetes mellitus (HCC)     Hypertension        PAST SURGICAL HISTORY:  Past Surgical History:   Procedure Laterality Date    COLONOSCOPY      ESOPHAGOGASTRODUODENOSCOPY      [place] CIRCUMCISION AGE >28 DAYS N/A 1/3/2017    Procedure: CIRCUMCISION WITH PENILE NERVE BLOCK;  Surgeon: Yoel Garcia MD;  Location: [facility];  Service: Urology    US GUIDED INJECTION FOR RESEARCH STUDY  3/9/2015       SOCIAL HISTORY:  Social History     Tobacco Use   Smoking Status Former   Smokeless Tobacco Never   Tobacco Comments    quit 20 yrs ago          PHYSICAL  EXAM:  64 y.o. male sitting comfortably on exam chair in no apparent distress.   Ambulates with normal gait, leaning forward  Able to go up on toes and heels   Able to balance on one leg  No TTP over thoracic or lumbar spine   5/5 motor strength in bilateral lower extremities with normal sensation   2+ left patellar reflex  1+ right patellar reflex      RADIOGRAPHIC STUDIES:  MRI, lumbar spine, 9/27/2023: Severe degenerative disc disease at L5-S1 with loss of disc height and bone-on-bone deformity.  There is evidence of posterior disc protrusion and facet hypertrophy resulting in lateral recess stenosis.  There is no stenosis at other levels of the lumbar spine.  There is evidence of disc desiccation throughout the lumbar spine.  Xrays, lumbar spine, 5/06/2024: Severe degenerative changes at L5-S1. No evidence of instability. Normal alignment.  Minimal degenerative changes at other levels of the lumbar spine.      ASSESSMENT:  1. Chronic bilateral low back pain without sciatica  -     XR spine lumbar minimum 4 views non injury; Future; Expected date: 05/06/2024  -     Ambulatory Referral to Physical Therapy; Future    2. DDD (degenerative disc disease), lumbar  -     Ambulatory Referral to Physical Therapy; Future        PLAN:  64 y.o. male with chronic low back pain and lumbar DDD.     Xrays and MRI of lumbar spine were reviewed in the office today. Patient is not a surgical candidate at this time. It was discussed that since patient has not undergone conservative management of symptoms within the past year, we will start with the basics. Patient is advised to trial a course of aqua therapy. Script provided today. If there is no improvement of symptoms with aqua therapy, then spinal injections may be considered. However, patient does report concerns with hyperglycemia after cortisone due to having diabetes.     I did review the records.  He did have extensive land-based physical therapy last year.  He is reported  having injections 2 to 3 years ago.  I am little surprised that the surgery was scheduled given the fact that hemoglobin A1c was elevated at the end the last year.  He is aware that hemoglobin A1c has to be below 8 when and if he decides to pursue surgery.    If he does fail conservative measures, an updated MRI study will be necessary prior to any surgical planning.    Patient will follow-up in 6 weeks for re-evaluation.        Scribe Attestation      I,:  Geovanna Chiu PA-C am acting as a scribe while in the presence of the attending physician.:       I,:  Corina Garcia MD personally performed the services described in this documentation    as scribed in my presence.:

## 2024-08-02 ENCOUNTER — HOSPITAL ENCOUNTER (EMERGENCY)
Facility: HOSPITAL | Age: 64
Discharge: HOME | End: 2024-08-02
Attending: EMERGENCY MEDICINE | Admitting: EMERGENCY MEDICINE
Payer: COMMERCIAL

## 2024-08-02 VITALS
HEART RATE: 85 BPM | RESPIRATION RATE: 16 BRPM | TEMPERATURE: 98.2 F | SYSTOLIC BLOOD PRESSURE: 119 MMHG | DIASTOLIC BLOOD PRESSURE: 69 MMHG | OXYGEN SATURATION: 96 %

## 2024-08-02 DIAGNOSIS — L02.31 CELLULITIS AND ABSCESS OF BUTTOCK: Primary | ICD-10-CM

## 2024-08-02 DIAGNOSIS — L03.317 CELLULITIS AND ABSCESS OF BUTTOCK: Primary | ICD-10-CM

## 2024-08-02 PROCEDURE — 99284 EMERGENCY DEPT VISIT MOD MDM: CPT | Performed by: EMERGENCY MEDICINE

## 2024-08-02 PROCEDURE — 10061 I&D ABSCESS COMP/MULTIPLE: CPT | Performed by: EMERGENCY MEDICINE

## 2024-08-02 PROCEDURE — 87186 SC STD MICRODIL/AGAR DIL: CPT | Performed by: EMERGENCY MEDICINE

## 2024-08-02 PROCEDURE — 87205 SMEAR GRAM STAIN: CPT | Performed by: EMERGENCY MEDICINE

## 2024-08-02 PROCEDURE — 87070 CULTURE OTHR SPECIMN AEROBIC: CPT | Performed by: EMERGENCY MEDICINE

## 2024-08-02 PROCEDURE — 99283 EMERGENCY DEPT VISIT LOW MDM: CPT

## 2024-08-02 RX ORDER — CEPHALEXIN 250 MG/1
500 CAPSULE ORAL ONCE
Status: COMPLETED | OUTPATIENT
Start: 2024-08-02 | End: 2024-08-02

## 2024-08-02 RX ORDER — SULFAMETHOXAZOLE AND TRIMETHOPRIM 800; 160 MG/1; MG/1
1 TABLET ORAL 2 TIMES DAILY
Qty: 14 TABLET | Refills: 0 | Status: SHIPPED | OUTPATIENT
Start: 2024-08-02 | End: 2024-08-09

## 2024-08-02 RX ORDER — SULFAMETHOXAZOLE AND TRIMETHOPRIM 800; 160 MG/1; MG/1
1 TABLET ORAL ONCE
Status: COMPLETED | OUTPATIENT
Start: 2024-08-02 | End: 2024-08-02

## 2024-08-02 RX ORDER — CEPHALEXIN 250 MG/1
250 CAPSULE ORAL EVERY 6 HOURS SCHEDULED
Qty: 28 CAPSULE | Refills: 0 | Status: SHIPPED | OUTPATIENT
Start: 2024-08-02 | End: 2024-08-09

## 2024-08-02 RX ORDER — LIDOCAINE HYDROCHLORIDE AND EPINEPHRINE 10; 10 MG/ML; UG/ML
10 INJECTION, SOLUTION INFILTRATION; PERINEURAL ONCE
Status: COMPLETED | OUTPATIENT
Start: 2024-08-02 | End: 2024-08-02

## 2024-08-02 RX ADMIN — SULFAMETHOXAZOLE AND TRIMETHOPRIM 1 TABLET: 800; 160 TABLET ORAL at 18:48

## 2024-08-02 RX ADMIN — CEPHALEXIN 500 MG: 250 CAPSULE ORAL at 18:48

## 2024-08-02 RX ADMIN — LIDOCAINE HYDROCHLORIDE,EPINEPHRINE BITARTRATE 10 ML: 10; .01 INJECTION, SOLUTION INFILTRATION; PERINEURAL at 18:35

## 2024-08-02 NOTE — DISCHARGE INSTRUCTIONS
Thank you for visiting the Emergency Department today.    Abscess drained  Change dressing 1-2 times a day  Return if symptoms worsen  Take BOTH antibiotics as directed until finished

## 2024-08-02 NOTE — ED PROVIDER NOTES
History  Chief Complaint   Patient presents with    Rectal Pain     Patient presents with rectal pain/lump since Saturday.      HPI  This is a 64-year-old male has a past medical history which includes diabetes who presents for evaluation of pain and swelling to the right buttock region.  He reports since Saturday, 6 days now he is worsening pain and swelling.  Reports intermittent drainage at times only scant somewhat bloody slightly purulent.  Denies pain with defecation or pain in the rectum/midline.  Denies any history of any multidrug-resistant organisms or recent antibiotics or infections.  Reports something like this happened about 40 years ago and was drained.  Prior to Admission Medications   Prescriptions Last Dose Informant Patient Reported? Taking?   ACCU-CHEK DIEGO PLUS test strip  Self Yes No   Si (two) times a day Test blood sugar   Patient not taking: Reported on 2024   HYDROcodone-acetaminophen (NORCO) 5-325 mg per tablet Unknown Self No No   Sig: Take 2 tablets by mouth every 6 (six) hours as needed for pain for up to 40 doses Max Daily Amount: 8 tablets   JANUMET -1000 MG TB24  Self Yes No   Sig: TAKE 1 TABLET BY MOUTH EVERY DAY WITH EVENING MEAL   Patient not taking: Reported on 2024   Linzess 145 MCG CAPS  Self Yes No   Sig: Take 145 mcg by mouth daily   Ozempic, 0.25 or 0.5 MG/DOSE, 2 MG/3ML injection pen 2024 Self Yes Yes   Sig: Inject 0.5 mg under the skin   TiZANidine (ZANAFLEX) 4 MG capsule  Self Yes No   Sig: Take 4 mg by mouth daily   Patient not taking: Reported on 2024   Trulicity 0.75 MG/0.5ML injection Past Week Self Yes Yes   Sig: INJECT 0.75 MG SUBCUTANEOUSLY ONE TIME PER WEEK   acetaminophen (TYLENOL) 325 mg tablet  Self Yes No   Sig: Take 650 mg by mouth every 6 (six) hours as needed for mild pain 2 tabs   aspirin (ECOTRIN LOW STRENGTH) 81 mg EC tablet 2024 Self Yes Yes   Sig: Take 81 mg by mouth daily   docusate sodium (COLACE) 100 mg capsule   No No    Sig: Take 1 capsule by mouth 2 (two) times a day for 30 days   gabapentin (NEURONTIN) 600 MG tablet 8/2/2024 Self Yes Yes   Sig: Take 600 mg by mouth 3 (three) times a day   glipiZIDE (GLUCOTROL XL) 10 mg 24 hr tablet 8/2/2024 Self Yes Yes   Sig: Take 10 mg by mouth daily   glipiZIDE (GLUCOTROL) 10 mg tablet  Self Yes No   Sig: Take 10 mg by mouth daily   lisinopril (ZESTRIL) 10 mg tablet 8/2/2024 Self Yes Yes   Sig: Take 10 mg by mouth daily   omeprazole (PriLOSEC) 40 MG capsule 8/2/2024 Self Yes Yes   Sig: Take 40 mg by mouth   oxyCODONE-acetaminophen (PERCOCET) 5-325 mg per tablet Not Taking Self Yes No   Sig: Take 1-2 tablets by mouth every 4 (four) hours   Patient not taking: Reported on 5/6/2024   sildenafil (REVATIO) 20 mg tablet  Self No No   Sig: Take 1 tablet (20 mg total) by mouth as needed (on demand, 1 hr prior to ntercourse)   sitaGLIPtin (JANUVIA) 100 mg tablet Unknown Self Yes No   Sig: Take 100 mg by mouth daily   tamsulosin (FLOMAX) 0.4 mg 8/2/2024 Self Yes Yes   Sig: Take 0.4 mg by mouth daily   traMADol (ULTRAM) 50 mg tablet  Self Yes No   Sig: Take 50 mg by mouth every 6 (six) hours as needed for moderate pain   trospium chloride (SANCTURA) 20 mg tablet  Self Yes No   Sig: Take 20 mg by mouth daily      Facility-Administered Medications: None       Past Medical History:   Diagnosis Date    Diabetes mellitus (HCC)     Hypertension        Past Surgical History:   Procedure Laterality Date    COLONOSCOPY      ESOPHAGOGASTRODUODENOSCOPY      WI CIRCUMCISION AGE >28 DAYS N/A 1/3/2017    Procedure: CIRCUMCISION WITH PENILE NERVE BLOCK;  Surgeon: Yoel Garcia MD;  Location: [facility];  Service: Urology    US GUIDED INJECTION FOR RESEARCH STUDY  3/9/2015       History reviewed. No pertinent family history.  I have reviewed and agree with the history as documented.    E-Cigarette/Vaping     E-Cigarette/Vaping Substances     Social History     Tobacco Use    Smoking status: Former    Smokeless  tobacco: Never    Tobacco comments:     quit 20 yrs ago   Substance Use Topics    Alcohol use: Yes     Comment: socially    Drug use: No       Review of Systems   Constitutional:  Negative for chills and fever.   HENT:  Negative for ear pain and sore throat.    Eyes:  Negative for pain and visual disturbance.   Respiratory:  Negative for cough and shortness of breath.    Cardiovascular:  Negative for chest pain and palpitations.   Gastrointestinal:  Negative for abdominal pain and vomiting.   Genitourinary:  Negative for dysuria and hematuria.   Musculoskeletal:  Negative for arthralgias and back pain.        Right buttocks pain swelling redness drainage.   Skin:  Negative for color change and rash.   Neurological:  Negative for seizures and syncope.   All other systems reviewed and are negative.      Physical Exam  Physical Exam  Vitals and nursing note reviewed. Exam conducted with a chaperone present.   Constitutional:       Appearance: Normal appearance.   HENT:      Head: Normocephalic and atraumatic.      Right Ear: External ear normal.      Left Ear: External ear normal.      Nose: Nose normal.      Mouth/Throat:      Mouth: Mucous membranes are moist.      Pharynx: Oropharynx is clear.   Eyes:      Conjunctiva/sclera: Conjunctivae normal.   Cardiovascular:      Rate and Rhythm: Normal rate and regular rhythm.      Pulses: Normal pulses.      Heart sounds: Normal heart sounds.   Pulmonary:      Effort: Pulmonary effort is normal.      Breath sounds: Normal breath sounds.   Abdominal:      General: Abdomen is flat.      Palpations: Abdomen is soft.   Musculoskeletal:        Legs:       Comments: There is a large area of soft tissue swelling and fluctuance warmth and tenderness to the right buttocks region.  It does not track towards the rectum.  There is no pilonidal cyst.  There is no crepitus to palpation.  There is a small recently draining pustule which is not actively draining at this time.   Skin:      General: Skin is warm and dry.      Capillary Refill: Capillary refill takes less than 2 seconds.   Neurological:      General: No focal deficit present.      Mental Status: He is alert. Mental status is at baseline.         Vital Signs  ED Triage Vitals [08/02/24 1751]   Temperature Pulse Respirations Blood Pressure SpO2   98.2 °F (36.8 °C) 85 16 119/69 96 %      Temp Source Heart Rate Source Patient Position - Orthostatic VS BP Location FiO2 (%)   Tympanic Monitor Sitting Right arm --      Pain Score       9           Vitals:    08/02/24 1751   BP: 119/69   Pulse: 85   Patient Position - Orthostatic VS: Sitting         Visual Acuity      ED Medications  Medications   lidocaine-epinephrine (XYLOCAINE/EPINEPHRINE) 1 %-1:100,000 injection 10 mL (10 mL Infiltration Given 8/2/24 1835)   cephalexin (KEFLEX) capsule 500 mg (500 mg Oral Given 8/2/24 1848)   sulfamethoxazole-trimethoprim (BACTRIM DS) 800-160 mg per tablet 1 tablet (1 tablet Oral Given 8/2/24 1848)       Diagnostic Studies  Results Reviewed       Procedure Component Value Units Date/Time    Wound culture and Gram stain [[id number]] Collected: 08/02/24 1857    Lab Status: In process Specimen: Wound from Sacrum Updated: 08/02/24 1905                   No orders to display              Procedures  Incision and drain    Date/Time: 8/2/2024 7:04 PM    Performed by: Eamon Barrera DO  Authorized by: aEmon Barrera DO  Universal Protocol:  Risks and benefits: risks, benefits and alternatives were discussed  Consent given by: patient  Patient identity confirmed: verbally with patient    Patient location:  ED  Location:     Type:  Abscess    Location:  Lower extremity    Lower extremity location:  R buttock  Pre-procedure details:     Skin preparation:  Chloraprep  Anesthesia (see MAR for exact dosages):     Anesthesia method:  Topical application and local infiltration    Local anesthetic:  Lidocaine 1% WITH epi  Procedure details:     Complexity:   "Simple    Needle aspiration: no      Incision types:  Single straight    Scalpel blade:  10    Approach:  Open    Incision depth:  Subcutaneous    Wound management:  Probed and deloculated and irrigated with saline    Drainage:  Purulent and bloody    Drainage amount:  Moderate    Wound treatment:  Packing placed    Packing materials:  1/4 in iodoform gauze    Amount 1/4\" iodoform:  6cm inserted total approx 12cm length  Post-procedure details:     Patient tolerance of procedure:  Tolerated well, no immediate complications           ED Course                                 SBIRT 20yo+      Flowsheet Row Most Recent Value   Initial Alcohol Screen: US AUDIT-C     1. How often do you have a drink containing alcohol? 0 Filed at: 08/02/2024 1750   2. How many drinks containing alcohol do you have on a typical day you are drinking?  0 Filed at: 08/02/2024 1750   3a. Male UNDER 65: How often do you have five or more drinks on one occasion? 0 Filed at: 08/02/2024 1750   3b. FEMALE Any Age, or MALE 65+: How often do you have 4 or more drinks on one occassion? 0 Filed at: 08/02/2024 1750   Audit-C Score 0 Filed at: 08/02/2024 1750   AMADO: How many times in the past year have you...    Used an illegal drug or used a prescription medication for non-medical reasons? Never Filed at: 08/02/2024 1750                      Medical Decision Making  64-year-old male with a history of right buttock abscess.  Clinically has abscess without crepitus some recent drainage at home which was scant and a small pustule which is covered over.  Present ultrasound revealed significant cobblestoning across a several centimeter by several centimeter ranging with a smaller discrete fluid collection measuring about 1 cm x 1 cm x 1 cm.  Patient consented and underwent uneventful I&D with serosanguineous bloody and purulent output of the several cc.  Packing placed dressing applied will treat with antibiotics first dose of Keflex and Bactrim here in ER " wound culture sent return to ER precautions discussed agreeable to plan wound care instructions discussed.    Problems Addressed:  Cellulitis and abscess of buttock: acute illness or injury    Amount and/or Complexity of Data Reviewed  Labs: ordered.    Risk  Prescription drug management.                 Disposition  Final diagnoses:   Cellulitis and abscess of buttock     Time reflects when diagnosis was documented in both MDM as applicable and the Disposition within this note       Time User Action Codes Description Comment    8/2/2024  7:06 PM Eamon Barrera Add [L02.91] Abscess     8/2/2024  7:07 PM Eamon Barrera Remove [L02.91] Abscess     8/2/2024  7:08 PM Eamon Barrera Add [L02.31,  L03.317] Cellulitis and abscess of buttock           ED Disposition       ED Disposition   Discharge    Condition   Stable    Date/Time   Fri Aug 2, 2024 1906    Comment   Real Kay discharge to home/self care.                   Follow-up Information       Follow up With Specialties Details Why Contact Info Additional Information    Tito Johnson MD Internal Medicine Schedule an appointment as soon as possible for a visit  As needed [address] [facility] [address]  [phone]       [facility] Emergency Medicine Go to  If symptoms worsen [address] [facility] [id number] [phone] [facility], [address], [place], [address]            Current Discharge Medication List        START taking these medications    Details   cephalexin (KEFLEX) 250 mg capsule Take 1 capsule (250 mg total) by mouth every 6 (six) hours for 7 days  Qty: 28 capsule, Refills: 0    Associated Diagnoses: Cellulitis and abscess of buttock      sulfamethoxazole-trimethoprim (BACTRIM DS) 800-160 mg per tablet Take 1 tablet by mouth 2 (two) times a day for 7 days smx-tmp DS (BACTRIM) 800-160 mg tabs (1tab q12 D10)  Qty: 14 tablet,  Refills: 0    Associated Diagnoses: Cellulitis and abscess of buttock           CONTINUE these medications which have NOT CHANGED    Details   aspirin (ECOTRIN LOW STRENGTH) 81 mg EC tablet Take 81 mg by mouth daily      gabapentin (NEURONTIN) 600 MG tablet Take 600 mg by mouth 3 (three) times a day  Refills: 3      glipiZIDE (GLUCOTROL XL) 10 mg 24 hr tablet Take 10 mg by mouth daily  Refills: 5      lisinopril (ZESTRIL) 10 mg tablet Take 10 mg by mouth daily      omeprazole (PriLOSEC) 40 MG capsule Take 40 mg by mouth      Ozempic, 0.25 or 0.5 MG/DOSE, 2 MG/3ML injection pen Inject 0.5 mg under the skin      tamsulosin (FLOMAX) 0.4 mg Take 0.4 mg by mouth daily      Trulicity 0.75 MG/0.5ML injection INJECT 0.75 MG SUBCUTANEOUSLY ONE TIME PER WEEK      ACCU-CHEK DIEGO PLUS test strip 2 (two) times a day Test blood sugar  Refills: 5      acetaminophen (TYLENOL) 325 mg tablet Take 650 mg by mouth every 6 (six) hours as needed for mild pain 2 tabs      docusate sodium (COLACE) 100 mg capsule Take 1 capsule by mouth 2 (two) times a day for 30 days  Qty: 60 capsule, Refills: 0      glipiZIDE (GLUCOTROL) 10 mg tablet Take 10 mg by mouth daily      HYDROcodone-acetaminophen (NORCO) 5-325 mg per tablet Take 2 tablets by mouth every 6 (six) hours as needed for pain for up to 40 doses Max Daily Amount: 8 tablets  Qty: 40 tablet, Refills: 0      JANUMET -1000 MG TB24 TAKE 1 TABLET BY MOUTH EVERY DAY WITH EVENING MEAL  Refills: 5      Linzess 145 MCG CAPS Take 145 mcg by mouth daily      oxyCODONE-acetaminophen (PERCOCET) 5-325 mg per tablet Take 1-2 tablets by mouth every 4 (four) hours      sildenafil (REVATIO) 20 mg tablet Take 1 tablet (20 mg total) by mouth as needed (on demand, 1 hr prior to ntercourse)  Qty: 30 tablet, Refills: 0    Associated Diagnoses: Vasculogenic erectile dysfunction, unspecified vasculogenic erectile dysfunction type      sitaGLIPtin (JANUVIA) 100 mg tablet Take 100 mg by mouth daily       TiZANidine (ZANAFLEX) 4 MG capsule Take 4 mg by mouth daily      traMADol (ULTRAM) 50 mg tablet Take 50 mg by mouth every 6 (six) hours as needed for moderate pain      trospium chloride (SANCTURA) 20 mg tablet Take 20 mg by mouth daily             No discharge procedures on file.    PDMP Review       None            ED Provider  Electronically Signed by             Eamon Barrera DO  08/02/24 7656

## 2024-08-03 LAB
BACTERIA WND AEROBE CULT: NORMAL
GRAM STN SPEC: NORMAL
GRAM STN SPEC: NORMAL

## 2024-08-05 LAB
BACTERIA WND AEROBE CULT: ABNORMAL
GRAM STN SPEC: ABNORMAL
GRAM STN SPEC: ABNORMAL

## 2024-08-08 ENCOUNTER — HOSPITAL ENCOUNTER (EMERGENCY)
Facility: HOSPITAL | Age: 64
Discharge: HOME | End: 2024-08-08
Attending: STUDENT IN AN ORGANIZED HEALTH CARE EDUCATION/TRAINING PROGRAM
Payer: COMMERCIAL

## 2024-08-08 VITALS
TEMPERATURE: 97.1 F | SYSTOLIC BLOOD PRESSURE: 127 MMHG | HEART RATE: 63 BPM | RESPIRATION RATE: 18 BRPM | DIASTOLIC BLOOD PRESSURE: 67 MMHG | OXYGEN SATURATION: 97 %

## 2024-08-08 DIAGNOSIS — L02.31 GLUTEAL ABSCESS: Primary | ICD-10-CM

## 2024-08-08 PROCEDURE — 99282 EMERGENCY DEPT VISIT SF MDM: CPT

## 2024-08-08 RX ORDER — SULFAMETHOXAZOLE AND TRIMETHOPRIM 800; 160 MG/1; MG/1
1 TABLET ORAL 2 TIMES DAILY
Qty: 10 TABLET | Refills: 0 | Status: SHIPPED | OUTPATIENT
Start: 2024-08-08 | End: 2024-08-13

## 2024-08-08 RX ORDER — LIDOCAINE HYDROCHLORIDE AND EPINEPHRINE 10; 10 MG/ML; UG/ML
20 INJECTION, SOLUTION INFILTRATION; PERINEURAL ONCE
Status: COMPLETED | OUTPATIENT
Start: 2024-08-08 | End: 2024-08-08

## 2024-08-08 RX ADMIN — LIDOCAINE HYDROCHLORIDE,EPINEPHRINE BITARTRATE 20 ML: 10; .01 INJECTION, SOLUTION INFILTRATION; PERINEURAL at 12:58

## 2024-08-08 NOTE — ED PROVIDER NOTES
History  Chief Complaint   Patient presents with    Abscess     Abscess to bottom states he was seen here on Friday and Monday for same. Was drained on Friday, placed on 2 antibiotics states that area around abscess is hard. Patient is concerned due to be a diabetic      This is a pleasant 64-year-old male who is here for another evaluation of right gluteal abscess.  He was seen here on the second he had an I&D completed he removed the packing the next day, I saw him personally on the fifth completed a bedside ultrasound did not reveal any fluid collections.  He is continuing Keflex Bactrim therapy this is known MRSA from the initial wound culture.  He states he follow with PCP today.  Patient's underlying concern is that there is infection.  He states he continues to take the antibiotics.  He is only expectorating a small amount of discharge from the wound despite still feeling a lump.  Vital signs reviewed within normal limits no tachycardia hypotension or fever.        Prior to Admission Medications   Prescriptions Last Dose Informant Patient Reported? Taking?   ACCU-CHEK DIEGO PLUS test strip  Self Yes No   Si (two) times a day Test blood sugar   Patient not taking: Reported on 2024   HYDROcodone-acetaminophen (NORCO) 5-325 mg per tablet  Self No No   Sig: Take 2 tablets by mouth every 6 (six) hours as needed for pain for up to 40 doses Max Daily Amount: 8 tablets   JANUMET -1000 MG TB24  Self Yes No   Sig: TAKE 1 TABLET BY MOUTH EVERY DAY WITH EVENING MEAL   Patient not taking: Reported on 2024   Linzess 145 MCG CAPS  Self Yes No   Sig: Take 145 mcg by mouth daily   Ozempic, 0.25 or 0.5 MG/DOSE, 2 MG/3ML injection pen  Self Yes No   Sig: Inject 0.5 mg under the skin   TiZANidine (ZANAFLEX) 4 MG capsule  Self Yes No   Sig: Take 4 mg by mouth daily   Patient not taking: Reported on 2024   Trulicity 0.75 MG/0.5ML injection  Self Yes No   Sig: INJECT 0.75 MG SUBCUTANEOUSLY ONE TIME PER WEEK    acetaminophen (TYLENOL) 325 mg tablet  Self Yes No   Sig: Take 650 mg by mouth every 6 (six) hours as needed for mild pain 2 tabs   aspirin (ECOTRIN LOW STRENGTH) 81 mg EC tablet  Self Yes No   Sig: Take 81 mg by mouth daily   cephalexin (KEFLEX) 250 mg capsule   No No   Sig: Take 1 capsule (250 mg total) by mouth every 6 (six) hours for 7 days   docusate sodium (COLACE) 100 mg capsule   No No   Sig: Take 1 capsule by mouth 2 (two) times a day for 30 days   gabapentin (NEURONTIN) 600 MG tablet  Self Yes No   Sig: Take 600 mg by mouth 3 (three) times a day   glipiZIDE (GLUCOTROL XL) 10 mg 24 hr tablet  Self Yes No   Sig: Take 10 mg by mouth daily   glipiZIDE (GLUCOTROL) 10 mg tablet  Self Yes No   Sig: Take 10 mg by mouth daily   lisinopril (ZESTRIL) 10 mg tablet  Self Yes No   Sig: Take 10 mg by mouth daily   omeprazole (PriLOSEC) 40 MG capsule  Self Yes No   Sig: Take 40 mg by mouth   oxyCODONE-acetaminophen (PERCOCET) 5-325 mg per tablet  Self Yes No   Sig: Take 1-2 tablets by mouth every 4 (four) hours   Patient not taking: Reported on 5/6/2024   sildenafil (REVATIO) 20 mg tablet  Self No No   Sig: Take 1 tablet (20 mg total) by mouth as needed (on demand, 1 hr prior to ntercourse)   sitaGLIPtin (JANUVIA) 100 mg tablet  Self Yes No   Sig: Take 100 mg by mouth daily   sulfamethoxazole-trimethoprim (BACTRIM DS) 800-160 mg per tablet   No No   Sig: Take 1 tablet by mouth 2 (two) times a day for 7 days smx-tmp DS (BACTRIM) 800-160 mg tabs (1tab q12 D10)   tamsulosin (FLOMAX) 0.4 mg  Self Yes No   Sig: Take 0.4 mg by mouth daily   traMADol (ULTRAM) 50 mg tablet  Self Yes No   Sig: Take 50 mg by mouth every 6 (six) hours as needed for moderate pain   trospium chloride (SANCTURA) 20 mg tablet  Self Yes No   Sig: Take 20 mg by mouth daily      Facility-Administered Medications: None       Past Medical History:   Diagnosis Date    Diabetes mellitus (HCC)     Hypertension        Past Surgical History:   Procedure  Laterality Date    COLONOSCOPY      ESOPHAGOGASTRODUODENOSCOPY      IA CIRCUMCISION AGE >28 DAYS N/A 1/3/2017    Procedure: CIRCUMCISION WITH PENILE NERVE BLOCK;  Surgeon: Yoel Garcia MD;  Location: [facility];  Service: Urology    US GUIDED INJECTION FOR RESEARCH STUDY  3/9/2015       History reviewed. No pertinent family history.  I have reviewed and agree with the history as documented.    E-Cigarette/Vaping     E-Cigarette/Vaping Substances     Social History     Tobacco Use    Smoking status: Former    Smokeless tobacco: Never    Tobacco comments:     quit 20 yrs ago   Substance Use Topics    Alcohol use: Yes     Comment: socially    Drug use: No       Review of Systems   Constitutional: Negative.  Negative for chills and fever.   HENT: Negative.  Negative for ear pain and sore throat.    Eyes: Negative.  Negative for pain and visual disturbance.   Respiratory: Negative.  Negative for cough and shortness of breath.    Cardiovascular: Negative.  Negative for chest pain and palpitations.   Gastrointestinal: Negative.  Negative for abdominal pain and vomiting.   Endocrine: Negative.    Genitourinary: Negative.  Negative for dysuria and hematuria.   Musculoskeletal: Negative.  Negative for arthralgias and back pain.   Skin:  Positive for wound. Negative for color change and rash.        Right gluteal wound   Allergic/Immunologic: Negative.    Neurological: Negative.  Negative for seizures and syncope.   Hematological: Negative.    Psychiatric/Behavioral: Negative.     All other systems reviewed and are negative.      Physical Exam  Physical Exam  Vitals reviewed.   Constitutional:       General: He is not in acute distress.     Appearance: Normal appearance. He is not ill-appearing, toxic-appearing or diaphoretic.   HENT:      Head: Normocephalic and atraumatic.      Right Ear: External ear normal.      Left Ear: External ear normal.   Eyes:      General: No scleral icterus.        Right eye: No  discharge.         Left eye: No discharge.      Extraocular Movements: Extraocular movements intact.      Conjunctiva/sclera: Conjunctivae normal.   Cardiovascular:      Rate and Rhythm: Normal rate.      Pulses: Normal pulses.   Pulmonary:      Effort: Pulmonary effort is normal. No respiratory distress.      Breath sounds: No stridor.   Musculoskeletal:         General: No deformity or signs of injury.      Cervical back: Normal range of motion. No rigidity.   Skin:     General: Skin is warm.      Coloration: Skin is not jaundiced.      Findings: No lesion or rash.      Comments: Patient has a 1 cm circular eschar formation from the original abscess in the right gluteal region.  There is very mild amount of firmness just deep to this.  Band-Aid reveals small amount of purulent discharge.  There is no tenderness or any apparent tracking into the rectum.    Bedside ultrasound point-of-care was used.  There is no evidence of large fluid collections that was evaluated up to 4. 5 cm deep.  There does appear to be a small sinus tract very superficial 1 cm from the skin.   Neurological:      General: No focal deficit present.      Mental Status: He is alert and oriented to person, place, and time. Mental status is at baseline.      Gait: Gait normal.   Psychiatric:         Mood and Affect: Mood normal.         Thought Content: Thought content normal.         Judgment: Judgment normal.         Vital Signs  ED Triage Vitals [08/08/24 1236]   Temperature Pulse Respirations Blood Pressure SpO2   (!) 97.1 °F (36.2 °C) 63 18 127/67 97 %      Temp Source Heart Rate Source Patient Position - Orthostatic VS BP Location FiO2 (%)   Temporal Monitor Sitting Right arm --      Pain Score       --           Vitals:    08/08/24 1236   BP: 127/67   Pulse: 63   Patient Position - Orthostatic VS: Sitting         Visual Acuity      ED Medications  Medications   lidocaine-epinephrine (XYLOCAINE/EPINEPHRINE) 1 %-1:100,000 injection 20 mL (20  mL Infiltration Given 8/8/24 1258)       Diagnostic Studies  Results Reviewed       None                   No orders to display              Procedures  Procedures     Area was cleansed with Betadine and sterile water mixture, after this was completed 10 cc of 1% lidocaine with epinephrine was injected in a field block type fashion around the area of concern which is the right gluteus.  Using an 11 blade scalpel under ultrasound guidance a single straight incision was made over the apparent sinus tract.  There was very minimal amount of drainage noted.  This was packed with half inch plain gauze approximately 6 cm.  This was taped to the skin followed by sterile 4 x 4 and tape.    ED Course                                 SBIRT 22yo+      Flowsheet Row Most Recent Value   Initial Alcohol Screen: US AUDIT-C     1. How often do you have a drink containing alcohol? 0 Filed at: 08/08/2024 1238   2. How many drinks containing alcohol do you have on a typical day you are drinking?  0 Filed at: 08/08/2024 1238   3a. Male UNDER 65: How often do you have five or more drinks on one occasion? 0 Filed at: 08/08/2024 1238   Audit-C Score 0 Filed at: 08/08/2024 1238   AMADO: How many times in the past year have you...    Used an illegal drug or used a prescription medication for non-medical reasons? Never Filed at: 08/08/2024 1238                      Medical Decision Making  64-year-old male who is here for evaluation of right gluteal infection.  See HPI for detailed history.  Differential diagnosis includes abscess, cellulitis, sepsis, perirectal abscess.  See    See procedure note.  There was a small amount of discharge removed.  I extended his Bactrim for a few more days given the known MRSA.  Follow-up primary care.    Risk  Prescription drug management.                 Disposition  Final diagnoses:   Gluteal abscess     Time reflects when diagnosis was documented in both MDM as applicable and the Disposition within this note        Time User Action Codes Description Comment    8/8/2024  1:24 PM Fran Alanis Add [L02.31] Gluteal abscess           ED Disposition       ED Disposition   Discharge    Condition   Stable    Date/Time   Thu Aug 8, 2024 1323    Comment   Real Kay discharge to home/self care.                   Follow-up Information       Follow up With Specialties Details Why Contact Info    Tito Johnson MD Internal Medicine Schedule an appointment as soon as possible for a visit   [address] [address]  [phone]              Patient's Medications   Discharge Prescriptions    SULFAMETHOXAZOLE-TRIMETHOPRIM (BACTRIM DS) 800-160 MG PER TABLET    Take 1 tablet by mouth 2 (two) times a day for 5 days smx-tmp DS (BACTRIM) 800-160 mg tabs (1tab q12 D10)       Start Date: 8/8/2024  End Date: 8/13/2024       Order Dose: 1 tablet       Quantity: 10 tablet    Refills: 0       No discharge procedures on file.    PDMP Review       None            ED Provider  Electronically Signed by             Fran Alanis PA-C  08/08/24 6869

## 2025-01-13 NOTE — PROGRESS NOTES
"Daily Note     Today's date: 2023  Patient name: Pasquale Pierce  : 1960  MRN: [id number]  Referring provider: Bernice Leal MD  Dx:   Encounter Diagnosis     ICD-10-CM    1  DDD (degenerative disc disease), lumbar  M51 36           Start Time: 1100  Stop Time: 1200  Total time in clinic (min): 60 minutes    Subjective: Pt notes min change in his LBP  Objective: See treatment diary below      Assessment: Tolerated treatment well  Patient would benefit from continued PT      Plan: Continue per plan of care  Precautions: Hx of Cervical fusion C5/6, C6/7    Date    FOTO   Ds 56     Manuals        Passive piriformis and hamstring stretch 30\" 5x ea  + gastroc 30\" 5x ea 30 \" 5 x ea  ds   Manual lumbar traction        Long axis traction to LEs        Neuro Re-Ed        PPT  20x 5\" 5\" 20x 15x 5\" 5\" 20x 5\" 15x    JUNIOR /ROW-tandem P3 30x P 3 30 x  JUNIOR  P 4 row  30 x L4 30x P3 30x JUNIOR  P4 30x Row P4  30x ea   Pallof press c walkout  P2 5x ea P 2  15x time each with press out 5x ea  P2 5x ea with press out P2 5x ea c press out     Triceps   P4 30x  P4 30x P4 30x           Ther Ex        NuStep 8m L5 L5 x 8 min 8m L7 Bike L1 x 8 min 8m L5   Child's pose 15x 5\" 5\" 10x 10x 5\" 5\" 10x 10x 5\"   Open Book 10x 5\" 10x 5\" 10x 5\" 5\" 10x 10x 5\"   Treadmill defer 2 5 to 3 0 mph x 5 min   Held today pain 5m 2 5 mph Declined 5m 2 0-2 8   Bird dog 10x 10x 10x 10x 10x           Ther Activity        Angry cat 15x 5\" 5\" 15x 15x 3\" 3\" 15x 15x 3\"           Gait Training                Modalities        HP  10m defer  defer                                      " Palliative Care Progress Note    Reason for Palliative Care: Complex Decision Making and Goals of Care     Subjective      Follow up       Symptom Assessment/Review of Systems  Palliative Performance Scale at Time of Visit  30 (bedbound, unable to do any activity, extensive disease. Needs total assistance. Reduced intake. Fully conscious, drowsy, or confused.)    Symptom Screening  Pain: 0 - none  Shortness of breath: 0 - none  Nausea: 0 - none  Last recorded BM: 1 (01/12/25 2200) .  Constipation: No.    Fatigue: 3 - mild    Heart Failure: Stage C: Structural heart disease with prior or current symptoms of heart failure    Review of Systems: Please defer to HPI    Medications:  Medications Reviewed: Yes          Objective        Vital Signs:   Vital Last Value (24 Hour) 24 Hour Range   Temperature 97.7 °F (36.5 °C) (01/13/25 0835) Temp  Min: 97.3 °F (36.3 °C)  Max: 98.4 °F (36.9 °C)   Pulse 62 (01/13/25 0835) Pulse  Min: 61  Max: 72   Non-Invasive  Blood Pressure (!) 143/74 (01/13/25 0835) BP  Min: 123/62  Max: 143/74   Respiratory 18 (01/13/25 0339) Resp  Min: 16  Max: 18   SpO2 100 % (01/13/25 0835) SpO2  Min: 98 %  Max: 100 %     Physical Exam  HENT:      Head: Normocephalic and atraumatic.      Mouth/Throat:      Mouth: Mucous membranes are dry.   Eyes:      Pupils: Pupils are equal, round, and reactive to light.   Cardiovascular:      Rate and Rhythm: Normal rate.      Heart sounds: Normal heart sounds.   Pulmonary:      Effort: No respiratory distress.      Breath sounds: No wheezing or rales.      Comments: crackles  Abdominal:      General: Bowel sounds are normal. There is no distension.      Tenderness: There is no abdominal tenderness. There is no guarding.   Musculoskeletal:         General: No tenderness.      Right lower leg: Edema present.      Left lower leg: Edema present.   Skin:     General: Skin is warm and dry.      Findings: Bruising present.   Neurological:      Mental Status: She is alert.  She is disoriented.   Psychiatric:         Mood and Affect: Mood normal.         Behavior: Behavior normal.         Labs & Imaging  Recent Labs   Lab 25  0732 25  0637 01/10/25  0629   SODIUM 141 141 140   POTASSIUM 3.8 3.1* 3.4   CHLORIDE 102 103 105   CO2 36* 34* 33*   BUN 53* 51* 52*   CREATININE 1.66* 1.56* 1.71*   GFRESTIMATE 28* 30* 27*   CALCIUM 9.2 9.0 8.8   GLUCOSE 112* 127* 96      Recent Labs   Lab 25  0732 25  0637 01/10/25  0629   WBC 6.5 5.4 4.1*   HGB 8.5* 7.8* 8.4*   HCT 27.7* 26.2* 26.6*    268 265       Imaging Reports Reviewed: Yes        Advance Care Planning/Goals of Care   Discussion:   Palliative APN met at bedside with patient , no visitors at bedside . Patient unable to participate in goals of care conversation related to underlying  dementia.   Blood pressure this am 140's in bed, midodrine held.  Patient continues with poor appetite.  Patient needs orthostatic  blood pressures checked not just laying in bed.  When patient discharges rehab will be ambulating patient, and will re-admit patient if she is orthostatic in the 60's.      Dr. Zelaya spoke with son, discussed hospice as an appropriate service at this time for patient. Not recommending invasive procedures such as thoracentesis for patient but deferred to pulmonary.  Son asked for pulmonary consult.  Pulmonary note appreciated, they did not recommend need for thoracentesis for patient.  Son then asked for ID to re-assess patient.  ID evaluated patient, who is now completed antibiotics, and is pending repeat UA and culture.      Patient was accepted by multiple facilities over the weekend, son is  wanting a private bed for the patient, this was explained to son by  may be difficult insurance not requiring.       Sons goals are to discharge patient to rehab once stable,he has made it clear that he can not care for the patient at home.  Jose stated his father \" going into and out of  rehab and the hospital, and that is what he wishes for the patient, stated hospital can not dump patient on him at home. \"  Educated Jose at length that hospice is transitioning to another level of care allowing a patient to pass with dignity and respect, never dumping a patient.  Jose does not agree.     Pending clinical course.                      Assessment      Encounter For Palliative Care  Consulted to establish goals of care   Heart Failure preserved  ADAN on CKD  Syncope        Plan        Symptom Management   No acute distress             Patient without complaints    Follow up    Palliative NP will continue to follow     Case discussed with  who will arrange  palliative        Total Time Spent today on this visit EXCLUDES time spent with Advance Care Planning  Total time spent today on this visit is    50 minutes which includes reviewing tests in preparation to see patient, obtaining and reviewing separately obtained history, performing a medically appropriate exam and evaluation, ordering medications, tests or procedures, communicating with other healthcare professionals, and independently interpreting test results that are not separately billed.     Discussed with: Attending MD, RN, care mgmt team, SLP , DR. Liao     Thank you for involving Palliative Care.  Please contact the covering provider via Pager, Answering Service, or Perfect Serve.    Marleen Varela CNP      Progress Note For Department Use Only        #1 Post-Visit: No [facility] ([place] ONLY)  #2 Post-Visit: Yes  #3 Post-Visit: Yes

## 2025-02-26 ENCOUNTER — OFFICE VISIT (OUTPATIENT)
Dept: OBGYN CLINIC | Facility: CLINIC | Age: 65
End: 2025-02-26
Payer: COMMERCIAL

## 2025-02-26 VITALS — WEIGHT: 184 LBS | HEIGHT: 72 IN | BODY MASS INDEX: 24.92 KG/M2

## 2025-02-26 DIAGNOSIS — M51.360 DEGENERATION OF INTERVERTEBRAL DISC OF LUMBAR REGION WITH DISCOGENIC BACK PAIN: ICD-10-CM

## 2025-02-26 DIAGNOSIS — G89.29 CHRONIC BILATERAL LOW BACK PAIN WITHOUT SCIATICA: Primary | ICD-10-CM

## 2025-02-26 DIAGNOSIS — M54.50 CHRONIC BILATERAL LOW BACK PAIN WITHOUT SCIATICA: Primary | ICD-10-CM

## 2025-02-26 PROCEDURE — 99214 OFFICE O/P EST MOD 30 MIN: CPT | Performed by: ORTHOPAEDIC SURGERY

## 2025-02-26 RX ORDER — INSULIN GLARGINE 100 [IU]/ML
INJECTION, SOLUTION SUBCUTANEOUS
COMMUNITY
Start: 2025-01-13

## 2025-02-26 RX ORDER — ATORVASTATIN CALCIUM 40 MG/1
1 TABLET, FILM COATED ORAL DAILY
COMMUNITY
Start: 2025-01-03

## 2025-02-26 RX ORDER — EMPAGLIFLOZIN 25 MG/1
25 TABLET, FILM COATED ORAL EVERY MORNING
COMMUNITY

## 2025-02-26 NOTE — PROGRESS NOTES
Name: Real Kay      : 1960       MRN: [id number]   Encounter Provider: Corina Garcia MD   Encounter Date: 25  Encounter department: [facility] ORTHOPEDIC SPINE SURGERY      History of Present Illness    Real Kay is a 64 y.o. male who presents for follow up evaluation of lumbar spine pain. The patient was last seen in the office on 2024 when he was advised to begin aqua therapy. He admits to completing aqua therapy, and felt good while he was in the pool. Symptoms returned when he was out of the pool. He was discharged from PT because symptoms were not improving outside of PT. He is taking tramadol and ibuprofen for symptom management.       Diabetic: Yes   Nicotine use: No  BMI: Estimated body mass index is 24.95 kg/m² as calculated from the following:    Height as of this encounter: 6' (1.829 m).    Weight as of this encounter: 83.5 kg (184 lb).  Blood thinners: Aspirin      ALLERGIES: No Known Allergies    MEDICATIONS:    Current Outpatient Medications:   •  acetaminophen (TYLENOL) 325 mg tablet, Take 650 mg by mouth every 6 (six) hours as needed for mild pain 2 tabs, Disp: , Rfl:   •  aspirin (ECOTRIN LOW STRENGTH) 81 mg EC tablet, Take 81 mg by mouth daily, Disp: , Rfl:   •  atorvastatin (LIPITOR) 40 mg tablet, Take 1 tablet by mouth in the morning, Disp: , Rfl:   •  docusate sodium (COLACE) 100 mg capsule, Take 1 capsule by mouth 2 (two) times a day for 30 days, Disp: 60 capsule, Rfl: 0  •  gabapentin (NEURONTIN) 600 MG tablet, Take 600 mg by mouth 3 (three) times a day, Disp: , Rfl: 3  •  glipiZIDE (GLUCOTROL XL) 10 mg 24 hr tablet, Take 10 mg by mouth daily, Disp: , Rfl: 5  •  glipiZIDE (GLUCOTROL) 10 mg tablet, Take 10 mg by mouth daily, Disp: , Rfl:   •  HYDROcodone-acetaminophen (NORCO) 5-325 mg per tablet, Take 2 tablets by mouth every 6 (six) hours as needed for pain for up to 40 doses Max Daily Amount: 8 tablets, Disp: 40  tablet, Rfl: 0  •  Jardiance 25 MG TABS, Take 25 mg by mouth every morning, Disp: , Rfl:   •  Lantus SoloStar 100 units/mL SOPN, INJECT 38 UNITS DAILY E11.9, Disp: , Rfl:   •  Linzess 145 MCG CAPS, Take 145 mcg by mouth daily, Disp: , Rfl:   •  lisinopril (ZESTRIL) 10 mg tablet, Take 10 mg by mouth daily, Disp: , Rfl:   •  metFORMIN (GLUCOPHAGE) 1000 MG tablet, TAKE 1 TABLET BY MOUTH TWICE A DAY WITH MEALS FOR 90 DAYS, Disp: , Rfl:   •  omeprazole (PriLOSEC) 40 MG capsule, Take 40 mg by mouth, Disp: , Rfl:   •  Ozempic, 0.25 or 0.5 MG/DOSE, 2 MG/3ML injection pen, Inject 0.5 mg under the skin, Disp: , Rfl:   •  sildenafil (REVATIO) 20 mg tablet, Take 1 tablet (20 mg total) by mouth as needed (on demand, 1 hr prior to ntercourse), Disp: 30 tablet, Rfl: 0  •  sitaGLIPtin (JANUVIA) 100 mg tablet, Take 100 mg by mouth daily, Disp: , Rfl:   •  tamsulosin (FLOMAX) 0.4 mg, Take 0.4 mg by mouth daily, Disp: , Rfl:   •  traMADol (ULTRAM) 50 mg tablet, Take 50 mg by mouth every 6 (six) hours as needed for moderate pain, Disp: , Rfl:   •  trospium chloride (SANCTURA) 20 mg tablet, Take 20 mg by mouth daily, Disp: , Rfl:   •  Trulicity 0.75 MG/0.5ML injection, INJECT 0.75 MG SUBCUTANEOUSLY ONE TIME PER WEEK, Disp: , Rfl:   •  ACCU-CHEK DIEGO PLUS test strip, 2 (two) times a day Test blood sugar (Patient not taking: Reported on 5/6/2024), Disp: , Rfl: 5  •  JANUMET -1000 MG TB24, TAKE 1 TABLET BY MOUTH EVERY DAY WITH EVENING MEAL (Patient not taking: Reported on 5/6/2024), Disp: , Rfl: 5  •  TiZANidine (ZANAFLEX) 4 MG capsule, Take 4 mg by mouth daily (Patient not taking: Reported on 5/6/2024), Disp: , Rfl:      PAST MEDICAL HISTORY:   Past Medical History:   Diagnosis Date   • Diabetes mellitus (HCC)    • Hypertension        PAST SURGICAL HISTORY:  Past Surgical History:   Procedure Laterality Date   • BACK SURGERY     • COLONOSCOPY     • ESOPHAGOGASTRODUODENOSCOPY     • MA CIRCUMCISION AGE >28 DAYS N/A 01/03/2017     Procedure: CIRCUMCISION WITH PENILE NERVE BLOCK;  Surgeon: Yoel Garcia MD;  Location: [facility];  Service: Urology   • US GUIDED INJECTION FOR RESEARCH STUDY  03/09/2015       SOCIAL HISTORY:  Social History     Tobacco Use   Smoking Status Former   Smokeless Tobacco Never   Tobacco Comments    quit 20 yrs ago        Physical Exam    64 y.o. male sitting comfortably on exam chair in no apparent distress.   Ambulates with normal gait, leaning forward  Able to go up on toes and heels   Able to balance on one leg  No TTP over thoracic or lumbar spine   5/5 motor strength in bilateral lower extremities with normal sensation   2+ left patellar reflex  1+ right patellar reflex  ***    RADIOGRAPHIC STUDIES:  MRI, lumbar spine, 9/27/2023: Severe degenerative disc disease at L5-S1 with loss of disc height and bone-on-bone deformity.  There is evidence of posterior disc protrusion and facet hypertrophy resulting in lateral recess stenosis.  There is no stenosis at other levels of the lumbar spine.  There is evidence of disc desiccation throughout the lumbar spine.    Xrays, lumbar spine, 5/06/2024: Severe degenerative changes at L5-S1. No evidence of instability. Normal alignment.  Minimal degenerative changes at other levels of the lumbar spine.          Assessment & Plan  Chronic bilateral low back pain without sciatica    Orders:  •  Ambulatory referral to Spine & Pain Management; Future    Degeneration of intervertebral disc of lumbar region with discogenic back pain    Orders:  •  Ambulatory referral to Spine & Pain Management; Future           PLAN:  64 y.o. male with chronic low back pain and lumbar DDD.    Treatment options were discussed including injection therapy. ***         Scribe Attestation    I,:  Winnie Mason am acting as a scribe while in the presence of the attending physician.:       I,:  Corina Garcia MD personally performed the services described in this documentation    as scribed in my  presence.:

## 2025-02-26 NOTE — PROGRESS NOTES
Name: Real Kay      : 1960       MRN: [id number]   Encounter Provider: Corina Garcia MD   Encounter Date: 25  Encounter department: [facility] ORTHOPEDIC SPINE SURGERY      History of Present Illness    Real Kay is a 64 y.o. male who presents for follow up evaluation of lumbar spine pain. The patient was last seen in the office on 2024 when he was advised to begin aqua therapy. He admits to completing aqua therapy, and felt good while he was in the pool. Symptoms returned when he was out of the pool. He was discharged from PT because symptoms were not improving outside of PT. He is taking tramadol and ibuprofen for symptom management.       Diabetic: Yes   Nicotine use: No  BMI: Estimated body mass index is 24.95 kg/m² as calculated from the following:    Height as of this encounter: 6' (1.829 m).    Weight as of this encounter: 83.5 kg (184 lb).  Blood thinners: Aspirin      ALLERGIES: No Known Allergies    MEDICATIONS:    Current Outpatient Medications:     acetaminophen (TYLENOL) 325 mg tablet, Take 650 mg by mouth every 6 (six) hours as needed for mild pain 2 tabs, Disp: , Rfl:     aspirin (ECOTRIN LOW STRENGTH) 81 mg EC tablet, Take 81 mg by mouth daily, Disp: , Rfl:     atorvastatin (LIPITOR) 40 mg tablet, Take 1 tablet by mouth in the morning, Disp: , Rfl:     docusate sodium (COLACE) 100 mg capsule, Take 1 capsule by mouth 2 (two) times a day for 30 days, Disp: 60 capsule, Rfl: 0    gabapentin (NEURONTIN) 600 MG tablet, Take 600 mg by mouth 3 (three) times a day, Disp: , Rfl: 3    glipiZIDE (GLUCOTROL XL) 10 mg 24 hr tablet, Take 10 mg by mouth daily, Disp: , Rfl: 5    glipiZIDE (GLUCOTROL) 10 mg tablet, Take 10 mg by mouth daily, Disp: , Rfl:     HYDROcodone-acetaminophen (NORCO) 5-325 mg per tablet, Take 2 tablets by mouth every 6 (six) hours as needed for pain for up to 40 doses Max Daily Amount: 8 tablets, Disp: 40 tablet,  Rfl: 0    Jardiance 25 MG TABS, Take 25 mg by mouth every morning, Disp: , Rfl:     Lantus SoloStar 100 units/mL SOPN, INJECT 38 UNITS DAILY E11.9, Disp: , Rfl:     Linzess 145 MCG CAPS, Take 145 mcg by mouth daily, Disp: , Rfl:     lisinopril (ZESTRIL) 10 mg tablet, Take 10 mg by mouth daily, Disp: , Rfl:     metFORMIN (GLUCOPHAGE) 1000 MG tablet, TAKE 1 TABLET BY MOUTH TWICE A DAY WITH MEALS FOR 90 DAYS, Disp: , Rfl:     omeprazole (PriLOSEC) 40 MG capsule, Take 40 mg by mouth, Disp: , Rfl:     Ozempic, 0.25 or 0.5 MG/DOSE, 2 MG/3ML injection pen, Inject 0.5 mg under the skin, Disp: , Rfl:     sildenafil (REVATIO) 20 mg tablet, Take 1 tablet (20 mg total) by mouth as needed (on demand, 1 hr prior to ntercourse), Disp: 30 tablet, Rfl: 0    sitaGLIPtin (JANUVIA) 100 mg tablet, Take 100 mg by mouth daily, Disp: , Rfl:     tamsulosin (FLOMAX) 0.4 mg, Take 0.4 mg by mouth daily, Disp: , Rfl:     traMADol (ULTRAM) 50 mg tablet, Take 50 mg by mouth every 6 (six) hours as needed for moderate pain, Disp: , Rfl:     trospium chloride (SANCTURA) 20 mg tablet, Take 20 mg by mouth daily, Disp: , Rfl:     Trulicity 0.75 MG/0.5ML injection, INJECT 0.75 MG SUBCUTANEOUSLY ONE TIME PER WEEK, Disp: , Rfl:     ACCU-CHEK DIEGO PLUS test strip, 2 (two) times a day Test blood sugar (Patient not taking: Reported on 5/6/2024), Disp: , Rfl: 5    JANUMET -1000 MG TB24, TAKE 1 TABLET BY MOUTH EVERY DAY WITH EVENING MEAL (Patient not taking: Reported on 5/6/2024), Disp: , Rfl: 5    TiZANidine (ZANAFLEX) 4 MG capsule, Take 4 mg by mouth daily (Patient not taking: Reported on 5/6/2024), Disp: , Rfl:      PAST MEDICAL HISTORY:   Past Medical History:   Diagnosis Date    Diabetes mellitus (HCC)     Hypertension        PAST SURGICAL HISTORY:  Past Surgical History:   Procedure Laterality Date    BACK SURGERY      COLONOSCOPY      ESOPHAGOGASTRODUODENOSCOPY      ND CIRCUMCISION AGE >28 DAYS N/A 01/03/2017    Procedure: CIRCUMCISION WITH  PENILE NERVE BLOCK;  Surgeon: Yoel Garcia MD;  Location: [facility] OR;  Service: Urology    US GUIDED INJECTION FOR RESEARCH STUDY  03/09/2015       SOCIAL HISTORY:  Social History     Tobacco Use   Smoking Status Former   Smokeless Tobacco Never   Tobacco Comments    quit 20 yrs ago        Physical Exam    64 y.o. male sitting comfortably on exam chair in no apparent distress.   Ambulates with normal gait, leaning forward  Able to go up on toes and heels   Able to balance on one leg  No TTP over thoracic or lumbar spine   5/5 motor strength in bilateral lower extremities with normal sensation     RADIOGRAPHIC STUDIES:  MRI, lumbar spine, 9/27/2023: Severe degenerative disc disease at L5-S1 with loss of disc height and bone-on-bone deformity.  There is evidence of posterior disc protrusion and facet hypertrophy resulting in lateral recess stenosis.  There is no stenosis at other levels of the lumbar spine.  There is evidence of disc desiccation throughout the lumbar spine.    Xrays, lumbar spine, 5/06/2024: Severe degenerative changes at L5-S1. No evidence of instability. Normal alignment.  Minimal degenerative changes at other levels of the lumbar spine.          Assessment & Plan  Chronic bilateral low back pain without sciatica    Orders:    Ambulatory referral to Spine & Pain Management; Future    Degeneration of intervertebral disc of lumbar region with discogenic back pain    Orders:    Ambulatory referral to Spine & Pain Management; Future           PLAN:  64 y.o. male with chronic low back pain and lumbar DDD.  I did review his x-rays and MRI study.  On x-ray there is evidence of bone-on-bone deformity and vacuum disc formation.  On MRI scan there is evidence of neuroforaminal stenosis at L5-S1.  Mild degenerative changes are present at other levels of the lumbar spine but there is no evidence of central or lateral recess stenosis.    Treatment options were discussed including injection therapy. The  patient was referred to spine and pain management for consideration of injection therapy. He may use OTC analgesics as needed for symptom management.     The patient was informed that if he would like to be seen for his cervical spine, he would need to bring him prior images and records for review.  I did explain to him that I have no access to the diagnostic studies of his cervical spine and not fully aware of the type surgeries had in the past.  I be glad to see him respect his neck but I will need to records and the diagnostic studies from [facility].    He will follow up with me as needed.    Scribe Attestation      I,:  Winnie Mason am acting as a scribe while in the presence of the attending physician.:       I,:  Corina Garcia MD personally performed the services described in this documentation    as scribed in my presence.:

## 2025-04-09 ENCOUNTER — CONSULT (OUTPATIENT)
Dept: PAIN MEDICINE | Facility: CLINIC | Age: 65
End: 2025-04-09
Payer: COMMERCIAL

## 2025-04-09 VITALS
TEMPERATURE: 97.8 F | HEART RATE: 80 BPM | OXYGEN SATURATION: 99 % | RESPIRATION RATE: 16 BRPM | HEIGHT: 72 IN | BODY MASS INDEX: 24.14 KG/M2 | WEIGHT: 178.2 LBS

## 2025-04-09 DIAGNOSIS — M47.816 LUMBAR SPONDYLOSIS: ICD-10-CM

## 2025-04-09 DIAGNOSIS — M48.062 SPINAL STENOSIS OF LUMBAR REGION WITH NEUROGENIC CLAUDICATION: ICD-10-CM

## 2025-04-09 DIAGNOSIS — E11.42 DIABETIC POLYNEUROPATHY ASSOCIATED WITH TYPE 2 DIABETES MELLITUS (HCC): ICD-10-CM

## 2025-04-09 DIAGNOSIS — M51.360 DEGENERATION OF INTERVERTEBRAL DISC OF LUMBAR REGION WITH DISCOGENIC BACK PAIN: ICD-10-CM

## 2025-04-09 DIAGNOSIS — G89.4 CHRONIC PAIN SYNDROME: ICD-10-CM

## 2025-04-09 DIAGNOSIS — R29.898 RIGHT LEG WEAKNESS: Primary | ICD-10-CM

## 2025-04-09 PROCEDURE — 99204 OFFICE O/P NEW MOD 45 MIN: CPT | Performed by: ANESTHESIOLOGY

## 2025-04-09 PROCEDURE — G2211 COMPLEX E/M VISIT ADD ON: HCPCS | Performed by: ANESTHESIOLOGY

## 2025-04-09 RX ORDER — PREGABALIN 50 MG/1
50 CAPSULE ORAL 3 TIMES DAILY
Qty: 90 CAPSULE | Refills: 1 | Status: SHIPPED | OUTPATIENT
Start: 2025-04-09 | End: 2025-05-09

## 2025-04-09 NOTE — PATIENT INSTRUCTIONS
Patient Education     Core Strengthening Exercises on Back or on Hands and Knees   About this topic   Your core muscles are in your chest, back, buttock, and stomach area. They are your abdominal, back, and pelvis muscles. These muscles help keep your body stable when using your arms or legs. They also help with balance and posture. There are many exercises you can do to keep these muscles strong.  If you have back problems like a compression fracture or a ruptured disc, doing some of these exercises could make your problem worse. Some of these exercises may cause lower back pain.  General   Before starting with a program, ask your doctor if you are healthy enough to do these exercises. Your doctor may have you work with a , chiropractor, or physical therapist to make a safe exercise program to meet your needs.  Strengthening Exercises   Strengthening exercises keep your muscles firm and strong. Start by repeating each exercise 2 to 3 times. Work up to doing each exercise 10 times. Try to do the exercises 2 to 3 times each day. Hold each exercise for 3 to 5 seconds. Do all exercises slowly.  Hip lifts ? Lie on your back with your knees bent and feet flat on the floor. Tighten your stomach muscles and push your heels into the floor to lift your buttocks off the floor. Relax.  Pelvic tilts ? Lie on your back with your knees bent and feet flat on the floor. Tighten your stomach muscles and press your lower back down to the floor. Relax.  Straight leg raises lying down ? Lie on your back with one leg straight. Bend your other knee so the foot is flat on the bed. Keeping your leg straight, lift the leg up to the level of your other knee. Lower it back down. Repeat with the other leg.  Knee flex lying down ? Lie on your back with both knees bent and your feet flat on the floor. Tighten your belly muscles. Raise one leg up and back down as if you are marching in slow motion. Keep belly muscles tight while you move  your leg. Switch legs. To make this exercise harder, raise both arms straight up in the air. Tighten your belly muscles. When you raise one leg up, reach the opposite arm over your head. Switch, moving the opposite arm and leg until you have done 10 repetitions on each side.  Abdominal crunches ? Lie on your back with both knees bent. Keep your feet flat on the floor. Place your hands in one of these positions. Try starting with the first position since it is the easiest. As you get better, use the other positions to make it harder.  Crunches with arms at sides.  Crunches with arms across chest.  Crunches with arms behind head. Be careful not to interlock your fingers behind your neck or head while doing crunches. This may add tension to your neck and cause strain.  Look at the ceiling. Tighten your belly muscles and lift your shoulders and upper back off the floor. Breathe out while you are doing this. Lower your shoulders to the floor. Breathe in while you are doing this. Relax your belly muscles all the way before starting another crunch.  Arm and leg lifts on hands and knees ? Start on your hands and knees. With all of these exercises, keep your back as level as possible. If you are having trouble with this, you may want to put a small object on your back such as a book. If it falls off, you are not keeping your back level enough during the exercise.  Lift one arm up to shoulder level and hold. Lower it back down. Now, lift up the other arm and hold.  Lift one leg up and kick it straight out until it is in line with your back and hold. Lower it back down. Now, lift up the other leg and hold.  Lift one arm and the OPPOSITE leg up at the same time and hold. Lower them down. Now, repeat using the other arm and leg. This is a very hard exercise. It may take time to be able to do this.               What will the results be?   Stronger core  Better balance  More toned belly and back muscles  Easier to do daily  activities  Better sports performance  Less low back pain  Helpful tips   Stay active and work out to keep your muscles strong and flexible.  Keep a healthy weight to avoid putting too much stress on your spine. Eat a healthy diet to keep your muscles healthy.  Be sure you do not hold your breath when exercising. This can raise your blood pressure. If you tend to hold your breath, try counting out loud when exercising. If any exercise bothers you, stop right away.  Try walking or cycling at an easy pace for a few minutes to warm up your muscles. Do this again after exercising.  Exercise may be slightly uncomfortable, but you should not have sharp pains. If you do get sharp pains, stop what you are doing. If the sharp pains continue, call your doctor.  Last Reviewed Date   2021-03-18  Consumer Information Use and Disclaimer   This generalized information is a limited summary of diagnosis, treatment, and/or medication information. It is not meant to be comprehensive and should be used as a tool to help the user understand and/or assess potential diagnostic and treatment options. It does NOT include all information about conditions, treatments, medications, side effects, or risks that may apply to a specific patient. It is not intended to be medical advice or a substitute for the medical advice, diagnosis, or treatment of a health care provider based on the health care provider's examination and assessment of a patient’s specific and unique circumstances. Patients must speak with a health care provider for complete information about their health, medical questions, and treatment options, including any risks or benefits regarding use of medications. This information does not endorse any treatments or medications as safe, effective, or approved for treating a specific patient. [employer] and its affiliates disclaim any warranty or liability relating to this information or the use thereof. The use of this information  is governed by the Terms of Use, available at https://www.wolters[employer] Moveruwer.com/en/know/clinical-effectiveness-terms   Copyright   Copyright © 2024 [employer] and its affiliates and/or licensors. All rights reserved.

## 2025-04-09 NOTE — PROGRESS NOTES
Assessment:  1. Right leg weakness    2. Degeneration of intervertebral disc of lumbar region with discogenic back pain    3. Spinal stenosis of lumbar region with neurogenic claudication    4. Lumbar spondylosis    5. Chronic pain syndrome    6. Diabetic polyneuropathy associated with type 2 diabetes mellitus (HCC)        Plan:  Patient is a 64-year-old male complains of low back pain and bilateral leg pain with chronic patient secondary to lumbar degenerative disease with lumbar radiculopathy presents to office for initial consultation.  X-ray lumbar spine from 2024 shows an L5-S1 disc space narrowing with facet arthropathy.  Patient also has an MRI of the lumbar spine from 2023 which showed L3-4 moderate spinal canal stenosis with moderate left foraminal narrowing secondary to a left paramedian disc bulge, L4-L5 mild spinal stenosis with bilateral neuroforaminal narrowing, severe right and moderate to severe left foraminal narrowing at L5-S1.  Patient had a L5-S1 lateral discectomy on 9/25/2023.  Patient notes severe right leg weakness which has been progressing over several years.  1.  We will order an MRI of the lumbar spine to better assess the discogenic pathology by patient's left leg weakness.  Patient does have significant foraminal narrowing noted on MRI from 2023.  Based on the results we may consider surgical intervention  2.  Follow-up in 1 month to review diagnostic imaging and plan management  3.  We will trial Lyrica for diabetic polyneuropathy      History of Present Illness:    The patient is a 64 y.o. male who presents for consultation in regards to Back Pain.  Symptoms have been present for 2 years. Symptoms began without any precipitating injury or trauma. Pain is reported to be 7 on the numeric rating scale.  Symptoms are felt constantly and worst in the morning, nighttime.  Symptoms are characterized as burning, cramping, shooting, sharp, dull/aching, numbing, tingling, pressure-like,  throbbing, and stabbing.  Symptoms are associated with right leg weakness.  Aggravating factors include lying down, standing, bending, leaning forward, leaning bckward, sitting, walking, exercise, coughing/sneezing, and bowel movements.  Relieving factors include nothing.  No change in symptoms with kneeling, turning the head, and relaxation.  Treatments that have been helpful include nothing. physical therapy, acupuncture, home exercise, and TENS unit have provided no relief.  Medications to relieve symptoms include Tylenol, tizanidine.    Review of Systems:    Review of Systems   Musculoskeletal:  Positive for back pain and myalgias.   Neurological:  Positive for weakness and numbness.   All other systems reviewed and are negative.          Past Medical History:   Diagnosis Date    Diabetes mellitus (HCC)     Hypertension        Past Surgical History:   Procedure Laterality Date    BACK SURGERY      COLONOSCOPY      ESOPHAGOGASTRODUODENOSCOPY      AL CIRCUMCISION AGE >28 DAYS N/A 01/03/2017    Procedure: CIRCUMCISION WITH PENILE NERVE BLOCK;  Surgeon: Yoel Garcia MD;  Location: [facility];  Service: Urology    US GUIDED INJECTION FOR RESEARCH STUDY  03/09/2015       History reviewed. No pertinent family history.    Social History     Occupational History    Not on file   Tobacco Use    Smoking status: Former    Smokeless tobacco: Never    Tobacco comments:     quit 20 yrs ago   Substance and Sexual Activity    Alcohol use: Yes     Comment: socially    Drug use: No    Sexual activity: Not on file         Current Outpatient Medications:     acetaminophen (TYLENOL) 325 mg tablet, Take 650 mg by mouth every 6 (six) hours as needed for mild pain 2 tabs, Disp: , Rfl:     aspirin (ECOTRIN LOW STRENGTH) 81 mg EC tablet, Take 81 mg by mouth daily, Disp: , Rfl:     atorvastatin (LIPITOR) 40 mg tablet, Take 1 tablet by mouth in the morning, Disp: , Rfl:     docusate sodium (COLACE) 100 mg capsule, Take 1 capsule by  mouth 2 (two) times a day for 30 days, Disp: 60 capsule, Rfl: 0    gabapentin (NEURONTIN) 600 MG tablet, Take 600 mg by mouth 3 (three) times a day, Disp: , Rfl: 3    glipiZIDE (GLUCOTROL XL) 10 mg 24 hr tablet, Take 10 mg by mouth daily, Disp: , Rfl: 5    glipiZIDE (GLUCOTROL) 10 mg tablet, Take 10 mg by mouth daily, Disp: , Rfl:     HYDROcodone-acetaminophen (NORCO) 5-325 mg per tablet, Take 2 tablets by mouth every 6 (six) hours as needed for pain for up to 40 doses Max Daily Amount: 8 tablets, Disp: 40 tablet, Rfl: 0    Jardiance 25 MG TABS, Take 25 mg by mouth every morning, Disp: , Rfl:     Lantus SoloStar 100 units/mL SOPN, INJECT 38 UNITS DAILY E11.9, Disp: , Rfl:     Linzess 145 MCG CAPS, Take 145 mcg by mouth daily, Disp: , Rfl:     lisinopril (ZESTRIL) 10 mg tablet, Take 10 mg by mouth daily, Disp: , Rfl:     metFORMIN (GLUCOPHAGE) 1000 MG tablet, TAKE 1 TABLET BY MOUTH TWICE A DAY WITH MEALS FOR 90 DAYS, Disp: , Rfl:     omeprazole (PriLOSEC) 40 MG capsule, Take 40 mg by mouth, Disp: , Rfl:     Ozempic, 0.25 or 0.5 MG/DOSE, 2 MG/3ML injection pen, Inject 0.5 mg under the skin, Disp: , Rfl:     sildenafil (REVATIO) 20 mg tablet, Take 1 tablet (20 mg total) by mouth as needed (on demand, 1 hr prior to ntercourse), Disp: 30 tablet, Rfl: 0    sitaGLIPtin (JANUVIA) 100 mg tablet, Take 100 mg by mouth daily, Disp: , Rfl:     tamsulosin (FLOMAX) 0.4 mg, Take 0.4 mg by mouth daily, Disp: , Rfl:     traMADol (ULTRAM) 50 mg tablet, Take 50 mg by mouth every 6 (six) hours as needed for moderate pain, Disp: , Rfl:     trospium chloride (SANCTURA) 20 mg tablet, Take 20 mg by mouth daily, Disp: , Rfl:     Trulicity 0.75 MG/0.5ML injection, INJECT 0.75 MG SUBCUTANEOUSLY ONE TIME PER WEEK, Disp: , Rfl:     ACCU-CHEK DIEGO PLUS test strip, 2 (two) times a day Test blood sugar (Patient not taking: Reported on 5/6/2024), Disp: , Rfl: 5    JANUMET -1000 MG TB24, TAKE 1 TABLET BY MOUTH EVERY DAY WITH EVENING MEAL  (Patient not taking: Reported on 5/6/2024), Disp: , Rfl: 5    TiZANidine (ZANAFLEX) 4 MG capsule, Take 4 mg by mouth daily (Patient not taking: Reported on 5/6/2024), Disp: , Rfl:     No Known Allergies    Physical Exam:    Pulse 80   Temp 97.8 °F (36.6 °C)   Resp 16   Ht 6' (1.829 m)   Wt 80.8 kg (178 lb 3.2 oz)   SpO2 99%   BMI 24.17 kg/m²     Constitutional: normal, well developed, well nourished, alert, in no distress and non-toxic and no overt pain behavior.  Eyes: anicteric  HEENT: grossly intact  Neck: supple, symmetric, trachea midline and no masses   Pulmonary:even and unlabored  Cardiovascular:No edema or pitting edema present  Skin:Normal without rashes or lesions and well hydrated  Psychiatric:Mood and affect appropriate  Neurologic:Cranial Nerves II-XII grossly intact  Musculoskeletal:antalgic    Imaging  Study Result    Narrative & Impression         LUMBAR SPINE     INDICATION:   Low back pain, unspecified.      COMPARISON: MRI 9/27/2020     VIEWS:  XR SPINE LUMBAR MINIMUM 4 VIEWS NON INJURY  Images: 4     FINDINGS:     There are 5 non rib bearing lumbar vertebral bodies.      There is no evidence of acute fracture or destructive osseous lesion.     No evidence of instability during flexion/extension.      L5-S1 disc space narrowing with vacuum disc phenomenon and facet arthropathy at this level.     The pedicles appear intact.     Soft tissues are unremarkable.     IMPRESSION:        No acute osseous abnormality.       Degenerative changes as described.     Electronically signed: 05/06/2024 04:03 PM Valente Mckeon MPH,MD       MRI LUMBAR SPINE WO CONTRAST  Order: [id number]  Impression    IMPRESSION  1.  Degenerative disc and bony changes of the mid to lower lumbar spine, most pronounced at the L3-4 and L5-S1 levels as described above.  2.  Ambiguous lumbosacral anatomy.  See 1st paragraph above for further description.  Recommend correlation with plain x-ray imaging prior to any planned  intervention.  Narrative    EXAM  MRI L SPINE WO CONTRAST - 9/27/2023 6:36 am    HISTORY  Low back pain; Low back pain, no complicating feature; Chronic LBP duration >= 3 months; Worsening or not improving; PT/chiropractic  in the last 60 days; No known/automatically detected potential contraindications to imaging    COMPARISON  None    TECHNIQUE  Procedure: Sagittal and axial T1 and T2 weighted imaging obtained through the lumbosacral spine without contrast.    FINDINGS  For the purpose of this dictation, when counting from the bottom up there are 5 segmented lumbar vertebral bodies, there is disc flattening and reactive changes of the adjacent endplates at L5-S1, and the S1 - 2 disc space is hypoplastic.  This should be correlated with plain x-ray imaging prior to any planned intervention.  As another point of reference by MR imaging the conus terminates at the L1 level.    No gross scoliosis is identified.  No displaced spondylolysis is detected.  There is moderate disc flattening and desiccation at the L5-S1 level with Modic type 1 reactive changes of the adjacent vertebral endplates.  No gross periarticular edema of the facet joints appreciated.  The lumbar vertebral body alignment, heights and signal intensities are otherwise within normal limits.  The conus is of normal caliber and signal intensity and terminates at approximately the L1 level.    A normal flow void is seen in the infra-renal aorta.  No obstructive uropathy is detected.  No solid paraspinal mass or adenopathy is detected.  The bladder is not grossly distended.    Axial images show the following:  L1-2, L2-3: No significant disc bulge, and no spinal or foraminal stenosis appreciated.  L3-4: There is moderate spinal stenosis with mild right and moderate left neural foraminal narrowing secondary to a left paramedian disc bulge extending through the neural foramen.  L4-5: There is mild spinal stenosis and bilateral neural foraminal narrowing  secondary to a mild broad posterior disc bulge and mild bilateral facet arthropathy.  L5-S1: There is moderate disc flattening and desiccation.  There is a broad posterior disc-osteophyte bulge extending through the neural foramina bilaterally.  In addition there is moderate hypertrophic facet arthropathy protruding into the lateral recesses and neural foramina.  This results in severe right and moderate to severe left neural foraminal narrowing.  Exam End: 09/27/23  6:36 AM    Specimen Collected: 09/28/23  5:41 PM              No orders to display       No orders of the defined types were placed in this encounter.

## 2025-04-16 ENCOUNTER — HOSPITAL ENCOUNTER (OUTPATIENT)
Dept: MRI IMAGING | Facility: HOSPITAL | Age: 65
Discharge: HOME | End: 2025-04-16
Attending: ANESTHESIOLOGY
Payer: COMMERCIAL

## 2025-04-16 DIAGNOSIS — R29.898 RIGHT LEG WEAKNESS: ICD-10-CM

## 2025-04-16 DIAGNOSIS — M48.062 SPINAL STENOSIS OF LUMBAR REGION WITH NEUROGENIC CLAUDICATION: ICD-10-CM

## 2025-04-16 DIAGNOSIS — M51.360 DEGENERATION OF INTERVERTEBRAL DISC OF LUMBAR REGION WITH DISCOGENIC BACK PAIN: ICD-10-CM

## 2025-04-16 DIAGNOSIS — M47.816 LUMBAR SPONDYLOSIS: ICD-10-CM

## 2025-04-16 DIAGNOSIS — G89.4 CHRONIC PAIN SYNDROME: ICD-10-CM

## 2025-04-16 PROCEDURE — 72148 MRI LUMBAR SPINE W/O DYE: CPT

## 2025-04-29 ENCOUNTER — OFFICE VISIT (OUTPATIENT)
Age: 65
End: 2025-04-29
Payer: COMMERCIAL

## 2025-04-29 VITALS — BODY MASS INDEX: 23.57 KG/M2 | HEIGHT: 72 IN | WEIGHT: 174 LBS

## 2025-04-29 DIAGNOSIS — M54.16 LUMBAR RADICULOPATHY: ICD-10-CM

## 2025-04-29 DIAGNOSIS — M51.362 DEGENERATION OF INTERVERTEBRAL DISC OF LUMBAR REGION WITH DISCOGENIC BACK PAIN AND LOWER EXTREMITY PAIN: Primary | ICD-10-CM

## 2025-04-29 DIAGNOSIS — M47.816 LUMBAR SPONDYLOSIS: ICD-10-CM

## 2025-04-29 DIAGNOSIS — G89.4 CHRONIC PAIN SYNDROME: ICD-10-CM

## 2025-04-29 PROCEDURE — 99214 OFFICE O/P EST MOD 30 MIN: CPT | Performed by: ANESTHESIOLOGY

## 2025-04-29 PROCEDURE — G2211 COMPLEX E/M VISIT ADD ON: HCPCS | Performed by: ANESTHESIOLOGY

## 2025-04-29 NOTE — PROGRESS NOTES
Assessment:  1. Degeneration of intervertebral disc of lumbar region with discogenic back pain and lower extremity pain    2. Lumbar spondylosis    3. Lumbar radiculopathy    4. Chronic pain syndrome        Plan:  Patient is a 64-year-old male complains of low back pain and bilateral leg pain with chronic patient secondary to lumbar degenerative disease with lumbar radiculopathy presents to office for follow-up visit.  MRI lumbar spine shows multilevel facet arthropathy from L3-4 through L5-S1 with severe right and moderate left foraminal narrowing at L5-S1 and lateral recess stenosis on the left at L3-L4 affecting the left L4 nerve root.  Patient attempted regular physical therapy and aqua therapy with only exacerbation of radicular symptoms.  With leg surgical interpretation prior to any interventional management.  1.  We will provide patient with a neurosurgical referral for possible disc replacement patient does have severe to moderate left and severe right foraminal narrowing  2.  If the neurosurgeon would like patient to try intervention injections we will schedule for bilateral L5-S1 transforaminal epidural steroid injections  3.  Follow-up after neurosurgical consult          History of Present Illness:  The patient is a 65 y.o. male who presents for a follow up office visit in regards to Shoulder Pain, Back Pain, Leg Pain, and Foot Pain.   The patient’s current symptoms include 8 out of 10 constant pressure-like, numbness, pins-and-needles in particular time pattern.    Current pain medications includes:  None.     I have personally reviewed and/or updated the patient's past medical history, past surgical history, family history, social history, current medications, allergies, and vital signs today.         Review of Systems  Review of Systems   Constitutional:  Negative for unexpected weight change.   HENT:  Negative for hearing loss.    Eyes:  Negative for visual disturbance.   Respiratory:  Negative for  shortness of breath.    Cardiovascular:  Negative for leg swelling.   Gastrointestinal:  Negative for constipation.   Endocrine: Negative for polyuria.   Genitourinary:  Negative for difficulty urinating.   Musculoskeletal:  Positive for arthralgias and gait problem. Negative for joint swelling and myalgias.        Decreased range of motion  Joint stiffness  Swelling  Pain in extremity   Skin:  Positive for rash.   Neurological:  Negative for weakness and headaches.        Memory loss   Psychiatric/Behavioral:  Negative for decreased concentration.    All other systems reviewed and are negative.      Past Medical History:   Diagnosis Date    Diabetes mellitus (HCC)     Hypertension        Past Surgical History:   Procedure Laterality Date    BACK SURGERY      COLONOSCOPY      ESOPHAGOGASTRODUODENOSCOPY      AR CIRCUMCISION AGE >28 DAYS N/A 01/03/2017    Procedure: CIRCUMCISION WITH PENILE NERVE BLOCK;  Surgeon: Yoel Garcia MD;  Location: [facility] OR;  Service: Urology    US GUIDED INJECTION FOR RESEARCH STUDY  03/09/2015       History reviewed. No pertinent family history.    Social History     Occupational History    Not on file   Tobacco Use    Smoking status: Former    Smokeless tobacco: Never    Tobacco comments:     quit 20 yrs ago   Substance and Sexual Activity    Alcohol use: Yes     Comment: socially    Drug use: No    Sexual activity: Not Currently         Current Outpatient Medications:     acetaminophen (TYLENOL) 325 mg tablet, Take 650 mg by mouth every 6 (six) hours as needed for mild pain 2 tabs, Disp: , Rfl:     aspirin (ECOTRIN LOW STRENGTH) 81 mg EC tablet, Take 81 mg by mouth daily, Disp: , Rfl:     atorvastatin (LIPITOR) 40 mg tablet, Take 1 tablet by mouth in the morning, Disp: , Rfl:     gabapentin (NEURONTIN) 600 MG tablet, Take 600 mg by mouth 3 (three) times a day, Disp: , Rfl: 3    glipiZIDE (GLUCOTROL XL) 10 mg 24 hr tablet, Take 10 mg by mouth daily, Disp: , Rfl: 5    glipiZIDE  (GLUCOTROL) 10 mg tablet, Take 10 mg by mouth daily, Disp: , Rfl:     Jardiance 25 MG TABS, Take 25 mg by mouth every morning, Disp: , Rfl:     Lantus SoloStar 100 units/mL SOPN, INJECT 38 UNITS DAILY E11.9, Disp: , Rfl:     Linzess 145 MCG CAPS, Take 145 mcg by mouth daily, Disp: , Rfl:     lisinopril (ZESTRIL) 10 mg tablet, Take 10 mg by mouth daily, Disp: , Rfl:     metFORMIN (GLUCOPHAGE) 1000 MG tablet, TAKE 1 TABLET BY MOUTH TWICE A DAY WITH MEALS FOR 90 DAYS, Disp: , Rfl:     omeprazole (PriLOSEC) 40 MG capsule, Take 40 mg by mouth, Disp: , Rfl:     Ozempic, 0.25 or 0.5 MG/DOSE, 2 MG/3ML injection pen, Inject 0.5 mg under the skin, Disp: , Rfl:     pregabalin (LYRICA) 50 mg capsule, Take 1 capsule (50 mg total) by mouth 3 (three) times a day, Disp: 90 capsule, Rfl: 1    sildenafil (REVATIO) 20 mg tablet, Take 1 tablet (20 mg total) by mouth as needed (on demand, 1 hr prior to ntercourse), Disp: 30 tablet, Rfl: 0    sitaGLIPtin (JANUVIA) 100 mg tablet, Take 100 mg by mouth daily, Disp: , Rfl:     tamsulosin (FLOMAX) 0.4 mg, Take 0.4 mg by mouth daily, Disp: , Rfl:     trospium chloride (SANCTURA) 20 mg tablet, Take 20 mg by mouth daily, Disp: , Rfl:     Trulicity 0.75 MG/0.5ML injection, INJECT 0.75 MG SUBCUTANEOUSLY ONE TIME PER WEEK, Disp: , Rfl:     ACCU-CHEK DIEGO PLUS test strip, 2 (two) times a day Test blood sugar (Patient not taking: Reported on 5/6/2024), Disp: , Rfl: 5    docusate sodium (COLACE) 100 mg capsule, Take 1 capsule by mouth 2 (two) times a day for 30 days, Disp: 60 capsule, Rfl: 0    HYDROcodone-acetaminophen (NORCO) 5-325 mg per tablet, Take 2 tablets by mouth every 6 (six) hours as needed for pain for up to 40 doses Max Daily Amount: 8 tablets (Patient not taking: Reported on 4/9/2025), Disp: 40 tablet, Rfl: 0    JANUMET -1000 MG TB24, TAKE 1 TABLET BY MOUTH EVERY DAY WITH EVENING MEAL (Patient not taking: Reported on 4/29/2025), Disp: , Rfl: 5    TiZANidine (ZANAFLEX) 4 MG  capsule, Take 4 mg by mouth daily (Patient not taking: Reported on 5/6/2024), Disp: , Rfl:     traMADol (ULTRAM) 50 mg tablet, Take 50 mg by mouth every 6 (six) hours as needed for moderate pain (Patient not taking: Reported on 4/9/2025), Disp: , Rfl:     No Known Allergies    Physical Exam:    Ht 6' (1.829 m)   Wt 78.9 kg (174 lb) Comment: verbal per patient  BMI 23.60 kg/m²     Constitutional:normal, well developed, well nourished, alert, in no distress and non-toxic and no overt pain behavior.  Eyes:anicteric  HEENT:grossly intact  Neck:supple, symmetric, trachea midline and no masses   Pulmonary:even and unlabored  Cardiovascular:No edema or pitting edema present  Skin:Normal without rashes or lesions and well hydrated  Psychiatric:Mood and affect appropriate  Neurologic:Cranial Nerves II-XII grossly intact  Musculoskeletal:antalgic    Imaging    MRI LUMBAR SPINE WITHOUT CONTRAST     INDICATION: R29.898: Other symptoms and signs involving the musculoskeletal system  M51.360: Other intervertebral disc degeneration, lumbar region with discogenic back pain only  M48.062: Spinal stenosis, lumbar region with neurogenic claudication  M47.816: Spondylosis without myelopathy or radiculopathy, lumbar region  G89.4: Chronic pain syndrome.     COMPARISON: Outside MRI dated 9/27/2023     TECHNIQUE:  Multiplanar, multisequence imaging of the lumbar spine was performed. .        IMAGE QUALITY:  Diagnostic     FINDINGS:     VERTEBRAL BODIES: There is a transitional lumbosacral junction. S1 is a transitional segment with lumbarized bilaterally.  Slight retrolisthesis L5-S1. Modic type I degenerative marrow signal L5-S1.     SACRUM:  Normal signal within the sacrum. No evidence of insufficiency or stress fracture.     DISTAL CORD AND CONUS:  Normal size and signal within the distal cord and conus.     PARASPINAL SOFT TISSUES:  Paraspinal soft tissues are unremarkable.     LOWER THORACIC DISC SPACES:  Normal disc height and  signal.  No disc herniation, canal stenosis or foraminal narrowing.     LUMBAR DISC SPACES:     L1-L2:  Normal.     L2-L3: Endplate mild facet hypertrophy. No significant central or foraminal narrowing.     L3-L4: Moderate facet perjury and mild annular bulge. Is extremely small left paramedian/foraminal protrusion potentially impacts the descending left L4 nerve root with lateral recess stenosis. Foramina are patent.     L4-L5: Moderate facet hypertrophy. No significant central or foraminal narrowing.     L5-S1: Degenerative disc osteophyte complex with marginal osteophytes results in severe right and moderate left foraminal narrowing. Moderate facet hypertrophy.     OTHER FINDINGS:  None.     IMPRESSION:     Spondylotic degenerative changes are seen particularly at L3-4 where left paramedian/foraminal protrusion potentially impacts the descending left L4 nerve root. Correlate for left L4 radiculopathy.     Degenerative changes at L5-S1 result in severe right and moderate left foraminal narrowing.     Transitional lumbosacral junction.

## 2025-06-23 ENCOUNTER — OFFICE VISIT (OUTPATIENT)
Dept: NEUROSURGERY | Facility: CLINIC | Age: 65
End: 2025-06-23
Attending: ANESTHESIOLOGY
Payer: COMMERCIAL

## 2025-06-23 VITALS
OXYGEN SATURATION: 96 % | SYSTOLIC BLOOD PRESSURE: 116 MMHG | RESPIRATION RATE: 16 BRPM | HEART RATE: 64 BPM | HEIGHT: 72 IN | BODY MASS INDEX: 24.92 KG/M2 | DIASTOLIC BLOOD PRESSURE: 62 MMHG | TEMPERATURE: 97.5 F | WEIGHT: 184 LBS

## 2025-06-23 DIAGNOSIS — G89.4 CHRONIC PAIN SYNDROME: ICD-10-CM

## 2025-06-23 DIAGNOSIS — M51.362 DEGENERATION OF INTERVERTEBRAL DISC OF LUMBAR REGION WITH DISCOGENIC BACK PAIN AND LOWER EXTREMITY PAIN: ICD-10-CM

## 2025-06-23 DIAGNOSIS — M47.816 LUMBAR SPONDYLOSIS: ICD-10-CM

## 2025-06-23 DIAGNOSIS — M54.16 LUMBAR RADICULOPATHY: ICD-10-CM

## 2025-06-23 PROCEDURE — 99204 OFFICE O/P NEW MOD 45 MIN: CPT | Performed by: STUDENT IN AN ORGANIZED HEALTH CARE EDUCATION/TRAINING PROGRAM

## 2025-06-23 RX ORDER — SODIUM CHLORIDE 9 MG/ML
125 INJECTION, SOLUTION INTRAVENOUS CONTINUOUS
OUTPATIENT
Start: 2025-06-23

## 2025-06-23 RX ORDER — CEFAZOLIN SODIUM 2 G/50ML
2000 SOLUTION INTRAVENOUS ONCE
OUTPATIENT
Start: 2025-06-23 | End: 2025-06-23

## 2025-06-23 RX ORDER — CHLORHEXIDINE GLUCONATE ORAL RINSE 1.2 MG/ML
15 SOLUTION DENTAL ONCE
OUTPATIENT
Start: 2025-06-23 | End: 2025-06-23

## 2025-06-23 NOTE — PROGRESS NOTES
Name: Real Kay      : 1960      MRN: [id number]  Encounter Provider: Brian Ramey MD  Encounter Date: 2025   Encounter department: [facility]  :  Assessment & Plan  Lumbar spondylosis    Orders:    Ambulatory referral to Neurosurgery    Lumbar radiculopathy    Orders:    Ambulatory referral to Neurosurgery    Case request operating room: L5-S1 anterior lumbar interbody fusion, with neuromonitoring; Standing    Chronic pain syndrome    Orders:    Ambulatory referral to Neurosurgery    Case request operating room: L5-S1 anterior lumbar interbody fusion, with neuromonitoring; Standing    This is a 65-year-old male presenting for surgical evaluation for low back pain and bilateral lower extremity radiculopathy and paresthesias.    MRI of his lumbar spine demonstrates diffuse degeneration.  The worst is at L5-S1 where he has significant disc degeneration with disc space height loss resulting in severe bilateral foraminal stenosis.  There is slight retrolisthesis of L5 on S1 with mild bilateral lateral recess narrowing.    History of Present Illness     Real Kay is a 65 y.o. male who presents for surgical evaluation for low back pain and lower extremity pain.    HPI   2 years of low back pain which radiates into bilateral posterolateral thighs to the lateral legs to the feet. The pain is worst with walking and standing and he has to sit after 15 minutes of walking. The low back is worst than the legs. It is associated with numbness and tingling.    He underwent PT last year without much relief. He was going to undergo injections however    Review of Systems   Musculoskeletal:  Positive for back pain (LBP+BLE, WORSE ON LEFT), gait problem and myalgias.   Neurological:  Positive for weakness (BI/LEGS LEFT IS WORSE) and numbness (BI/LEGS AND FEET).   Psychiatric/Behavioral: Negative.       I have personally reviewed the MA's review of systems and made  changes as necessary.    Past Medical History   Past Medical History[1]  Past Surgical History[2]  Family History[3]  he reports that he has quit smoking. He has never used smokeless tobacco. He reports current alcohol use. He reports that he does not use drugs.  Current Outpatient Medications   Medication Instructions    ACCU-CHEK DIEGO PLUS test strip 2 times daily    acetaminophen (TYLENOL) 650 mg, Every 6 hours PRN    aspirin (ECOTRIN LOW STRENGTH) 81 mg, Daily    atorvastatin (LIPITOR) 40 mg tablet 1 tablet, Daily    docusate sodium (COLACE) 100 mg, Oral, 2 times daily    gabapentin (NEURONTIN) 600 mg, 3 times daily    glipiZIDE (GLUCOTROL XL) 10 mg, Daily    glipiZIDE (GLUCOTROL) 10 mg, Daily    Jardiance 25 mg, Every morning    Lantus SoloStar 100 units/mL SOPN     Linzess 145 mcg, Daily    lisinopril (ZESTRIL) 10 mg, Daily    metFORMIN (GLUCOPHAGE) 1000 MG tablet     omeprazole (PRILOSEC) 40 mg    Ozempic (0.25 or 0.5 MG/DOSE) 0.5 mg    pregabalin (LYRICA) 50 mg, Oral, 3 times daily    sildenafil (REVATIO) 20 mg, Oral, As needed    tamsulosin (FLOMAX) 0.4 mg, Daily    TiZANidine (ZANAFLEX) 4 mg, Daily    traMADol (ULTRAM) 50 mg, Every 6 hours PRN   Allergies[4]   Objective   /62 (BP Location: Left arm, Patient Position: Sitting, Cuff Size: Large)   Pulse 64   Temp 97.5 °F (36.4 °C) (Temporal)   Resp 16   Ht 6' (1.829 m)   Wt 83.5 kg (184 lb)   SpO2 96%   BMI 24.95 kg/m²     Physical Exam  Neurological Exam  General:  Normal, well developed, not in distress/pain     Skin:   No issues, no rashes noted     Musculoskeletal:   5/5 strength throughout all muscle groups  No tenderness to palpation of the spine       Neurologic Exam:  Awake and alert  Oriented x3  Speech clear and fluent  RITTER   Sensation to light touch and pin prick intact throughout  No corbett's  No clonus  2+ patellar reflexes     Gait:   normal gait, normal posture        Administrative Statements   I have spent a total time of 45  minutes in caring for this patient on the day of the visit/encounter including Diagnostic results, Prognosis, Risks and benefits of tx options, Instructions for management, Patient and family education, Impressions, Counseling / Coordination of care, Documenting in the medical record, Reviewing/placing orders in the medical record (including tests, medications, and/or procedures), and Obtaining or reviewing history  .           [1]   Past Medical History:  Diagnosis Date    Diabetes mellitus (HCC)     Hypertension    [2]   Past Surgical History:  Procedure Laterality Date    BACK SURGERY      COLONOSCOPY      ESOPHAGOGASTRODUODENOSCOPY      OR CIRCUMCISION AGE >28 DAYS N/A 01/03/2017    Procedure: CIRCUMCISION WITH PENILE NERVE BLOCK;  Surgeon: Yoel Garcia MD;  Location: [facility];  Service: Urology    US GUIDED INJECTION FOR RESEARCH STUDY  03/09/2015   [3] No family history on file.  [4] No Known Allergies

## 2025-06-23 NOTE — ASSESSMENT & PLAN NOTE
Orders:    Ambulatory referral to Neurosurgery    Case request operating room: L5-S1 anterior lumbar interbody fusion, with neuromonitoring; Standing

## 2025-06-27 ENCOUNTER — PREP FOR PROCEDURE (OUTPATIENT)
Dept: NEUROSURGERY | Facility: CLINIC | Age: 65
End: 2025-06-27

## 2025-06-27 DIAGNOSIS — G89.4 CHRONIC PAIN SYNDROME: ICD-10-CM

## 2025-06-27 DIAGNOSIS — M54.16 LUMBAR RADICULOPATHY: Primary | ICD-10-CM

## 2025-06-27 DIAGNOSIS — Z01.812 PRE-OPERATIVE LABORATORY EXAMINATION: ICD-10-CM

## 2025-08-21 ENCOUNTER — TELEPHONE (OUTPATIENT)
Age: 65
End: 2025-08-21

## (undated) DEVICE — GLOVE SRG BIOGEL 8

## (undated) DEVICE — CONMED ACCESSORY ELECTRODE, NEEDLE WITH EXTENDED INSULATION: Brand: CONMED

## (undated) DEVICE — SUT CHROMIC 3-0 SH 27 IN G122H

## (undated) DEVICE — SUT PROLENE 2-0 CT-2 30 IN 8411H

## (undated) DEVICE — STRETCH BANDAGE: Brand: CURITY

## (undated) DEVICE — CONMED ACCESSORY ELECTRODE, NEEDLE ELECTRODE: Brand: CONMED

## (undated) DEVICE — COBAN 2 IN UNSTERILE

## (undated) DEVICE — SUT SILK 2-0 TIES 144 IN LA55G

## (undated) DEVICE — GLOVE INDICATOR PI UNDERGLOVE SZ 8 BLUE

## (undated) DEVICE — SUT CHROMIC 4-0 RB-1 27 IN U203H

## (undated) DEVICE — SPECIMEN CONTAINER STERILE PEEL PACK

## (undated) DEVICE — SUT PROLENE 4-0 RB-1 30 IN 8871H

## (undated) DEVICE — SYRINGE 10ML LL

## (undated) DEVICE — SPONGE STICK WITH PVP-I: Brand: KENDALL

## (undated) DEVICE — STRL UNIVERSAL MINOR GENERAL: Brand: CARDINAL HEALTH

## (undated) DEVICE — OCCLUSIVE GAUZE STRIP,3% BISMUTH TRIBROMOPHENATE IN PETROLATUM BLEND: Brand: XEROFORM

## (undated) DEVICE — NEEDLE 25G X 1 1/2

## (undated) DEVICE — SUT PROLENE 3-0 SH 36 IN 8522H

## (undated) DEVICE — KERLIX BANDAGE ROLL: Brand: KERLIX

## (undated) DEVICE — PETROLATUM GAUZE CISION DRESSING: Brand: VASELINE

## (undated) DEVICE — INTENDED FOR TISSUE SEPARATION, AND OTHER PROCEDURES THAT REQUIRE A SHARP SURGICAL BLADE TO PUNCTURE OR CUT.: Brand: BARD-PARKER SAFETY BLADES SIZE 15, STERILE